# Patient Record
Sex: MALE | Race: WHITE | Employment: FULL TIME | URBAN - METROPOLITAN AREA
[De-identification: names, ages, dates, MRNs, and addresses within clinical notes are randomized per-mention and may not be internally consistent; named-entity substitution may affect disease eponyms.]

---

## 2019-04-05 LAB
ALBUMIN SERPL-MCNC: NORMAL G/DL
ALP BLD-CCNC: NORMAL U/L
ALT SERPL-CCNC: NORMAL U/L
ANION GAP SERPL CALCULATED.3IONS-SCNC: NORMAL MMOL/L
AST SERPL-CCNC: NORMAL U/L
BILIRUB SERPL-MCNC: NORMAL MG/DL (ref 0.1–1.4)
BUN BLDV-MCNC: NORMAL MG/DL
CALCIUM SERPL-MCNC: NORMAL MG/DL
CHLORIDE BLD-SCNC: NORMAL MMOL/L
CHOLESTEROL, TOTAL: 248 MG/DL
CHOLESTEROL/HDL RATIO: 4.5
CO2: NORMAL MMOL/L
CREAT SERPL-MCNC: NORMAL MG/DL
GFR CALCULATED: NORMAL
GLUCOSE BLD-MCNC: NORMAL MG/DL
HDLC SERPL-MCNC: 55 MG/DL (ref 35–70)
LDL CHOLESTEROL CALCULATED: 147 MG/DL (ref 0–160)
POTASSIUM SERPL-SCNC: NORMAL MMOL/L
SODIUM BLD-SCNC: NORMAL MMOL/L
TOTAL PROTEIN: NORMAL
TRIGL SERPL-MCNC: 280 MG/DL
VLDLC SERPL CALC-MCNC: NORMAL MG/DL

## 2019-09-27 ENCOUNTER — APPOINTMENT (OUTPATIENT)
Dept: GENERAL RADIOLOGY | Age: 54
End: 2019-09-27
Payer: COMMERCIAL

## 2019-09-27 ENCOUNTER — HOSPITAL ENCOUNTER (EMERGENCY)
Age: 54
Discharge: HOME | End: 2019-09-28
Attending: EMERGENCY MEDICINE
Payer: COMMERCIAL

## 2019-09-27 VITALS
BODY MASS INDEX: 41.75 KG/M2 | RESPIRATION RATE: 16 BRPM | HEART RATE: 91 BPM | TEMPERATURE: 98.6 F | HEIGHT: 73 IN | WEIGHT: 315 LBS | SYSTOLIC BLOOD PRESSURE: 132 MMHG | OXYGEN SATURATION: 92 % | DIASTOLIC BLOOD PRESSURE: 81 MMHG

## 2019-09-27 DIAGNOSIS — S93.05XA ANKLE DISLOCATION, LEFT, INITIAL ENCOUNTER: Primary | ICD-10-CM

## 2019-09-27 LAB
BASOPHILS ABSOLUTE: 0.05 E9/L (ref 0–0.2)
BASOPHILS RELATIVE PERCENT: 0.5 % (ref 0–2)
EKG ATRIAL RATE: 87 BPM
EKG P AXIS: 25 DEGREES
EKG P-R INTERVAL: 160 MS
EKG Q-T INTERVAL: 376 MS
EKG QRS DURATION: 86 MS
EKG QTC CALCULATION (BAZETT): 452 MS
EKG R AXIS: 44 DEGREES
EKG T AXIS: 12 DEGREES
EKG VENTRICULAR RATE: 87 BPM
EOSINOPHILS ABSOLUTE: 0.17 E9/L (ref 0.05–0.5)
EOSINOPHILS RELATIVE PERCENT: 1.5 % (ref 0–6)
HCT VFR BLD CALC: 48 % (ref 37–54)
HEMOGLOBIN: 16.7 G/DL (ref 12.5–16.5)
IMMATURE GRANULOCYTES #: 0.1 E9/L
IMMATURE GRANULOCYTES %: 0.9 % (ref 0–5)
LYMPHOCYTES ABSOLUTE: 1.72 E9/L (ref 1.5–4)
LYMPHOCYTES RELATIVE PERCENT: 15.6 % (ref 20–42)
MCH RBC QN AUTO: 32.1 PG (ref 26–35)
MCHC RBC AUTO-ENTMCNC: 34.8 % (ref 32–34.5)
MCV RBC AUTO: 92.1 FL (ref 80–99.9)
MONOCYTES ABSOLUTE: 0.87 E9/L (ref 0.1–0.95)
MONOCYTES RELATIVE PERCENT: 7.9 % (ref 2–12)
NEUTROPHILS ABSOLUTE: 8.14 E9/L (ref 1.8–7.3)
NEUTROPHILS RELATIVE PERCENT: 73.6 % (ref 43–80)
PDW BLD-RTO: 12.6 FL (ref 11.5–15)
PLATELET # BLD: 246 E9/L (ref 130–450)
PMV BLD AUTO: 9.5 FL (ref 7–12)
RBC # BLD: 5.21 E12/L (ref 3.8–5.8)
WBC # BLD: 11.1 E9/L (ref 4.5–11.5)

## 2019-09-27 PROCEDURE — 93010 ELECTROCARDIOGRAM REPORT: CPT | Performed by: INTERNAL MEDICINE

## 2019-09-27 PROCEDURE — 73610 X-RAY EXAM OF ANKLE: CPT

## 2019-09-27 PROCEDURE — 84484 ASSAY OF TROPONIN QUANT: CPT

## 2019-09-27 PROCEDURE — 80048 BASIC METABOLIC PNL TOTAL CA: CPT

## 2019-09-27 PROCEDURE — 29515 APPLICATION SHORT LEG SPLINT: CPT

## 2019-09-27 PROCEDURE — G0480 DRUG TEST DEF 1-7 CLASSES: HCPCS

## 2019-09-27 PROCEDURE — 85025 COMPLETE CBC W/AUTO DIFF WBC: CPT

## 2019-09-27 PROCEDURE — 83880 ASSAY OF NATRIURETIC PEPTIDE: CPT

## 2019-09-27 PROCEDURE — 81001 URINALYSIS AUTO W/SCOPE: CPT

## 2019-09-27 PROCEDURE — 93005 ELECTROCARDIOGRAM TRACING: CPT | Performed by: PHYSICIAN ASSISTANT

## 2019-09-27 PROCEDURE — 71045 X-RAY EXAM CHEST 1 VIEW: CPT

## 2019-09-27 PROCEDURE — 99284 EMERGENCY DEPT VISIT MOD MDM: CPT

## 2019-09-27 RX ORDER — LISINOPRIL 10 MG/1
20 TABLET ORAL DAILY
COMMUNITY
End: 2019-11-18

## 2019-09-27 RX ORDER — 0.9 % SODIUM CHLORIDE 0.9 %
1000 INTRAVENOUS SOLUTION INTRAVENOUS ONCE
Status: DISCONTINUED | OUTPATIENT
Start: 2019-09-27 | End: 2019-09-27

## 2019-09-27 ASSESSMENT — PAIN SCALES - GENERAL: PAINLEVEL_OUTOF10: 8

## 2019-09-27 ASSESSMENT — PAIN DESCRIPTION - PAIN TYPE: TYPE: ACUTE PAIN

## 2019-09-27 ASSESSMENT — PAIN DESCRIPTION - LOCATION: LOCATION: ANKLE

## 2019-09-27 ASSESSMENT — PAIN DESCRIPTION - ORIENTATION: ORIENTATION: RIGHT

## 2019-09-28 LAB
ANION GAP SERPL CALCULATED.3IONS-SCNC: 16 MMOL/L (ref 7–16)
BACTERIA: NORMAL /HPF
BILIRUBIN URINE: NEGATIVE
BLOOD, URINE: NEGATIVE
BUN BLDV-MCNC: 17 MG/DL (ref 6–20)
CALCIUM SERPL-MCNC: 9.4 MG/DL (ref 8.6–10.2)
CHLORIDE BLD-SCNC: 97 MMOL/L (ref 98–107)
CLARITY: CLEAR
CO2: 21 MMOL/L (ref 22–29)
COLOR: YELLOW
CREAT SERPL-MCNC: 1.1 MG/DL (ref 0.7–1.2)
ETHANOL: 109 MG/DL (ref 0–0.08)
GFR AFRICAN AMERICAN: >60
GFR NON-AFRICAN AMERICAN: >60 ML/MIN/1.73
GLUCOSE BLD-MCNC: 125 MG/DL (ref 74–99)
GLUCOSE URINE: NEGATIVE MG/DL
KETONES, URINE: NEGATIVE MG/DL
LEUKOCYTE ESTERASE, URINE: ABNORMAL
NITRITE, URINE: NEGATIVE
PH UA: 5.5 (ref 5–9)
POTASSIUM REFLEX MAGNESIUM: 5.7 MMOL/L (ref 3.5–5)
PRO-BNP: <5 PG/ML (ref 0–125)
PROTEIN UA: NEGATIVE MG/DL
RBC UA: NORMAL /HPF (ref 0–2)
SODIUM BLD-SCNC: 134 MMOL/L (ref 132–146)
SPECIFIC GRAVITY UA: 1.02 (ref 1–1.03)
TROPONIN: <0.01 NG/ML (ref 0–0.03)
UROBILINOGEN, URINE: 0.2 E.U./DL
WBC UA: NORMAL /HPF (ref 0–5)

## 2019-09-28 PROCEDURE — 2580000003 HC RX 258: Performed by: STUDENT IN AN ORGANIZED HEALTH CARE EDUCATION/TRAINING PROGRAM

## 2019-09-28 RX ORDER — 0.9 % SODIUM CHLORIDE 0.9 %
1000 INTRAVENOUS SOLUTION INTRAVENOUS ONCE
Status: COMPLETED | OUTPATIENT
Start: 2019-09-28 | End: 2019-09-28

## 2019-09-28 RX ORDER — OXYCODONE HYDROCHLORIDE AND ACETAMINOPHEN 5; 325 MG/1; MG/1
1 TABLET ORAL EVERY 8 HOURS PRN
Qty: 9 TABLET | Refills: 0 | Status: SHIPPED | OUTPATIENT
Start: 2019-09-28 | End: 2019-10-01

## 2019-09-28 RX ADMIN — SODIUM CHLORIDE 1000 ML: 9 INJECTION, SOLUTION INTRAVENOUS at 01:03

## 2019-09-28 ASSESSMENT — ENCOUNTER SYMPTOMS
ABDOMINAL DISTENTION: 0
DIARRHEA: 0
CHEST TIGHTNESS: 0
EYES NEGATIVE: 1
COUGH: 0
CONSTIPATION: 0
SHORTNESS OF BREATH: 0
ALLERGIC/IMMUNOLOGIC NEGATIVE: 1
RESPIRATORY NEGATIVE: 1
ABDOMINAL PAIN: 0

## 2019-10-01 PROBLEM — S82.434A CLOSED NONDISPLACED OBLIQUE FRACTURE OF SHAFT OF RIGHT FIBULA: Status: ACTIVE | Noted: 2019-10-01

## 2019-10-01 PROBLEM — S93.431A SPRAIN OF TIBIOFIBULAR LIGAMENT OF RIGHT ANKLE: Status: ACTIVE | Noted: 2019-10-01

## 2019-10-01 PROBLEM — S82.51XA: Status: ACTIVE | Noted: 2019-10-01

## 2019-10-02 ENCOUNTER — ANESTHESIA EVENT (OUTPATIENT)
Dept: OPERATING ROOM | Age: 54
End: 2019-10-02
Payer: COMMERCIAL

## 2019-10-02 ENCOUNTER — HOSPITAL ENCOUNTER (OUTPATIENT)
Age: 54
Setting detail: OUTPATIENT SURGERY
Discharge: HOME | End: 2019-10-02
Attending: ORTHOPAEDIC SURGERY | Admitting: ORTHOPAEDIC SURGERY
Payer: COMMERCIAL

## 2019-10-02 ENCOUNTER — ANESTHESIA (OUTPATIENT)
Dept: OPERATING ROOM | Age: 54
End: 2019-10-02
Payer: COMMERCIAL

## 2019-10-02 ENCOUNTER — HOSPITAL ENCOUNTER (OUTPATIENT)
Dept: GENERAL RADIOLOGY | Age: 54
Discharge: HOME | End: 2019-10-04
Attending: ORTHOPAEDIC SURGERY
Payer: COMMERCIAL

## 2019-10-02 ENCOUNTER — APPOINTMENT (OUTPATIENT)
Dept: GENERAL RADIOLOGY | Age: 54
End: 2019-10-02
Attending: ORTHOPAEDIC SURGERY
Payer: COMMERCIAL

## 2019-10-02 VITALS
OXYGEN SATURATION: 92 % | RESPIRATION RATE: 18 BRPM | HEIGHT: 73 IN | WEIGHT: 315 LBS | TEMPERATURE: 97.3 F | HEART RATE: 90 BPM | DIASTOLIC BLOOD PRESSURE: 84 MMHG | BODY MASS INDEX: 41.75 KG/M2 | SYSTOLIC BLOOD PRESSURE: 176 MMHG

## 2019-10-02 VITALS
TEMPERATURE: 97.3 F | DIASTOLIC BLOOD PRESSURE: 57 MMHG | OXYGEN SATURATION: 91 % | RESPIRATION RATE: 25 BRPM | SYSTOLIC BLOOD PRESSURE: 107 MMHG

## 2019-10-02 DIAGNOSIS — S82.434A CLOSED NONDISPLACED OBLIQUE FRACTURE OF SHAFT OF RIGHT FIBULA, INITIAL ENCOUNTER: ICD-10-CM

## 2019-10-02 DIAGNOSIS — R52 PAIN: ICD-10-CM

## 2019-10-02 DIAGNOSIS — S82.51XA CLOSED DISPLACED FRACTURE OF MEDIAL MALLEOLUS OF RIGHT TIBIA, INITIAL ENCOUNTER: Primary | ICD-10-CM

## 2019-10-02 DIAGNOSIS — S93.431A SPRAIN OF TIBIOFIBULAR LIGAMENT OF RIGHT ANKLE, INITIAL ENCOUNTER: ICD-10-CM

## 2019-10-02 PROCEDURE — 3700000000 HC ANESTHESIA ATTENDED CARE: Performed by: ORTHOPAEDIC SURGERY

## 2019-10-02 PROCEDURE — 6360000002 HC RX W HCPCS: Performed by: ANESTHESIOLOGY

## 2019-10-02 PROCEDURE — C1713 ANCHOR/SCREW BN/BN,TIS/BN: HCPCS | Performed by: ORTHOPAEDIC SURGERY

## 2019-10-02 PROCEDURE — C1769 GUIDE WIRE: HCPCS | Performed by: ORTHOPAEDIC SURGERY

## 2019-10-02 PROCEDURE — 2580000003 HC RX 258: Performed by: ORTHOPAEDIC SURGERY

## 2019-10-02 PROCEDURE — 2580000003 HC RX 258: Performed by: NURSE ANESTHETIST, CERTIFIED REGISTERED

## 2019-10-02 PROCEDURE — 64446 NJX AA&/STRD SC NRV NFS IMG: CPT | Performed by: ANESTHESIOLOGY

## 2019-10-02 PROCEDURE — 2500000003 HC RX 250 WO HCPCS: Performed by: NURSE ANESTHETIST, CERTIFIED REGISTERED

## 2019-10-02 PROCEDURE — 2500000003 HC RX 250 WO HCPCS: Performed by: ANESTHESIOLOGY

## 2019-10-02 PROCEDURE — 3600000004 HC SURGERY LEVEL 4 BASE: Performed by: ORTHOPAEDIC SURGERY

## 2019-10-02 PROCEDURE — 3700000001 HC ADD 15 MINUTES (ANESTHESIA): Performed by: ORTHOPAEDIC SURGERY

## 2019-10-02 PROCEDURE — 73610 X-RAY EXAM OF ANKLE: CPT

## 2019-10-02 PROCEDURE — 7100000001 HC PACU RECOVERY - ADDTL 15 MIN: Performed by: ORTHOPAEDIC SURGERY

## 2019-10-02 PROCEDURE — 7100000010 HC PHASE II RECOVERY - FIRST 15 MIN: Performed by: ORTHOPAEDIC SURGERY

## 2019-10-02 PROCEDURE — 6360000002 HC RX W HCPCS: Performed by: NURSE ANESTHETIST, CERTIFIED REGISTERED

## 2019-10-02 PROCEDURE — 7100000000 HC PACU RECOVERY - FIRST 15 MIN: Performed by: ORTHOPAEDIC SURGERY

## 2019-10-02 PROCEDURE — 2720000010 HC SURG SUPPLY STERILE: Performed by: ORTHOPAEDIC SURGERY

## 2019-10-02 PROCEDURE — 2709999900 HC NON-CHARGEABLE SUPPLY: Performed by: ORTHOPAEDIC SURGERY

## 2019-10-02 PROCEDURE — 3600000014 HC SURGERY LEVEL 4 ADDTL 15MIN: Performed by: ORTHOPAEDIC SURGERY

## 2019-10-02 PROCEDURE — 2500000003 HC RX 250 WO HCPCS: Performed by: ORTHOPAEDIC SURGERY

## 2019-10-02 PROCEDURE — 3209999900 FLUORO FOR SURGICAL PROCEDURES

## 2019-10-02 PROCEDURE — 7100000011 HC PHASE II RECOVERY - ADDTL 15 MIN: Performed by: ORTHOPAEDIC SURGERY

## 2019-10-02 PROCEDURE — 6360000002 HC RX W HCPCS: Performed by: ORTHOPAEDIC SURGERY

## 2019-10-02 DEVICE — SCREW BNE L38MM DIA4MM S STL CANN SHT 1/3 THRD SM HEX SOCK: Type: IMPLANTABLE DEVICE | Site: ANKLE | Status: FUNCTIONAL

## 2019-10-02 DEVICE — SCREW BNE L58MM DIA4MM CORT S STL ST NONCANNULATED: Type: IMPLANTABLE DEVICE | Site: ANKLE | Status: FUNCTIONAL

## 2019-10-02 DEVICE — SCREW BNE L65MM DIA4MM CORT S STL ST NONCANNULATED: Type: IMPLANTABLE DEVICE | Site: ANKLE | Status: FUNCTIONAL

## 2019-10-02 RX ORDER — ROPIVACAINE HYDROCHLORIDE 5 MG/ML
30 INJECTION, SOLUTION EPIDURAL; INFILTRATION; PERINEURAL ONCE
Status: COMPLETED | OUTPATIENT
Start: 2019-10-02 | End: 2019-10-02

## 2019-10-02 RX ORDER — NEOSTIGMINE METHYLSULFATE 1 MG/ML
INJECTION, SOLUTION INTRAVENOUS PRN
Status: DISCONTINUED | OUTPATIENT
Start: 2019-10-02 | End: 2019-10-02 | Stop reason: SDUPTHER

## 2019-10-02 RX ORDER — FENTANYL CITRATE 50 UG/ML
25 INJECTION, SOLUTION INTRAMUSCULAR; INTRAVENOUS PRN
Status: DISCONTINUED | OUTPATIENT
Start: 2019-10-02 | End: 2019-10-02 | Stop reason: HOSPADM

## 2019-10-02 RX ORDER — DEXAMETHASONE SODIUM PHOSPHATE 4 MG/ML
INJECTION, SOLUTION INTRA-ARTICULAR; INTRALESIONAL; INTRAMUSCULAR; INTRAVENOUS; SOFT TISSUE PRN
Status: DISCONTINUED | OUTPATIENT
Start: 2019-10-02 | End: 2019-10-02 | Stop reason: SDUPTHER

## 2019-10-02 RX ORDER — OXYCODONE HYDROCHLORIDE AND ACETAMINOPHEN 5; 325 MG/1; MG/1
.5-1 TABLET ORAL EVERY 6 HOURS PRN
Qty: 28 TABLET | Refills: 0 | Status: SHIPPED | OUTPATIENT
Start: 2019-10-02 | End: 2019-10-09

## 2019-10-02 RX ORDER — ROCURONIUM BROMIDE 10 MG/ML
INJECTION, SOLUTION INTRAVENOUS PRN
Status: DISCONTINUED | OUTPATIENT
Start: 2019-10-02 | End: 2019-10-02 | Stop reason: SDUPTHER

## 2019-10-02 RX ORDER — FENTANYL CITRATE 50 UG/ML
25 INJECTION, SOLUTION INTRAMUSCULAR; INTRAVENOUS EVERY 5 MIN PRN
Status: DISCONTINUED | OUTPATIENT
Start: 2019-10-02 | End: 2019-10-02 | Stop reason: HOSPADM

## 2019-10-02 RX ORDER — SODIUM CHLORIDE 9 MG/ML
INJECTION, SOLUTION INTRAVENOUS CONTINUOUS PRN
Status: DISCONTINUED | OUTPATIENT
Start: 2019-10-02 | End: 2019-10-02 | Stop reason: SDUPTHER

## 2019-10-02 RX ORDER — GLYCOPYRROLATE 1 MG/5 ML
SYRINGE (ML) INTRAVENOUS PRN
Status: DISCONTINUED | OUTPATIENT
Start: 2019-10-02 | End: 2019-10-02 | Stop reason: SDUPTHER

## 2019-10-02 RX ORDER — ONDANSETRON 2 MG/ML
INJECTION INTRAMUSCULAR; INTRAVENOUS PRN
Status: DISCONTINUED | OUTPATIENT
Start: 2019-10-02 | End: 2019-10-02 | Stop reason: SDUPTHER

## 2019-10-02 RX ORDER — LIDOCAINE HYDROCHLORIDE 20 MG/ML
INJECTION, SOLUTION EPIDURAL; INFILTRATION; INTRACAUDAL; PERINEURAL PRN
Status: DISCONTINUED | OUTPATIENT
Start: 2019-10-02 | End: 2019-10-02 | Stop reason: SDUPTHER

## 2019-10-02 RX ORDER — SODIUM CHLORIDE, SODIUM LACTATE, POTASSIUM CHLORIDE, CALCIUM CHLORIDE 600; 310; 30; 20 MG/100ML; MG/100ML; MG/100ML; MG/100ML
INJECTION, SOLUTION INTRAVENOUS CONTINUOUS PRN
Status: DISCONTINUED | OUTPATIENT
Start: 2019-10-02 | End: 2019-10-02 | Stop reason: SDUPTHER

## 2019-10-02 RX ORDER — PROPOFOL 10 MG/ML
INJECTION, EMULSION INTRAVENOUS PRN
Status: DISCONTINUED | OUTPATIENT
Start: 2019-10-02 | End: 2019-10-02 | Stop reason: SDUPTHER

## 2019-10-02 RX ORDER — FENTANYL CITRATE 50 UG/ML
INJECTION, SOLUTION INTRAMUSCULAR; INTRAVENOUS PRN
Status: DISCONTINUED | OUTPATIENT
Start: 2019-10-02 | End: 2019-10-02 | Stop reason: SDUPTHER

## 2019-10-02 RX ORDER — MIDAZOLAM HYDROCHLORIDE 1 MG/ML
1 INJECTION INTRAMUSCULAR; INTRAVENOUS EVERY 5 MIN PRN
Status: DISCONTINUED | OUTPATIENT
Start: 2019-10-02 | End: 2019-10-02 | Stop reason: HOSPADM

## 2019-10-02 RX ORDER — ASPIRIN 81 MG/1
81 TABLET ORAL DAILY
Qty: 100 TABLET | Refills: 0 | COMMUNITY
Start: 2019-10-03 | End: 2019-11-18

## 2019-10-02 RX ORDER — BUPIVACAINE HYDROCHLORIDE AND EPINEPHRINE 5; 5 MG/ML; UG/ML
INJECTION, SOLUTION EPIDURAL; INTRACAUDAL; PERINEURAL PRN
Status: DISCONTINUED | OUTPATIENT
Start: 2019-10-02 | End: 2019-10-02 | Stop reason: ALTCHOICE

## 2019-10-02 RX ORDER — LIDOCAINE HYDROCHLORIDE 10 MG/ML
5 INJECTION, SOLUTION EPIDURAL; INFILTRATION; INTRACAUDAL; PERINEURAL ONCE
Status: COMPLETED | OUTPATIENT
Start: 2019-10-02 | End: 2019-10-02

## 2019-10-02 RX ORDER — OXYCODONE HYDROCHLORIDE AND ACETAMINOPHEN 5; 325 MG/1; MG/1
1 TABLET ORAL
Status: DISCONTINUED | OUTPATIENT
Start: 2019-10-02 | End: 2019-10-02 | Stop reason: HOSPADM

## 2019-10-02 RX ADMIN — Medication 2 MG: at 17:57

## 2019-10-02 RX ADMIN — MIDAZOLAM HYDROCHLORIDE 2 MG: 1 INJECTION, SOLUTION INTRAMUSCULAR; INTRAVENOUS at 15:56

## 2019-10-02 RX ADMIN — DEXAMETHASONE SODIUM PHOSPHATE 10 MG: 4 INJECTION, SOLUTION INTRAMUSCULAR; INTRAVENOUS at 16:44

## 2019-10-02 RX ADMIN — PROPOFOL 200 MG: 10 INJECTION, EMULSION INTRAVENOUS at 16:36

## 2019-10-02 RX ADMIN — LIDOCAINE HYDROCHLORIDE 100 MG: 20 INJECTION, SOLUTION EPIDURAL; INFILTRATION; INTRACAUDAL; PERINEURAL at 16:32

## 2019-10-02 RX ADMIN — CEFAZOLIN SODIUM 3 G: 10 INJECTION, POWDER, FOR SOLUTION INTRAVENOUS at 16:25

## 2019-10-02 RX ADMIN — Medication 0.4 MG: at 17:57

## 2019-10-02 RX ADMIN — ONDANSETRON HYDROCHLORIDE 4 MG: 2 INJECTION, SOLUTION INTRAMUSCULAR; INTRAVENOUS at 16:44

## 2019-10-02 RX ADMIN — LIDOCAINE HYDROCHLORIDE 5 ML: 10 INJECTION, SOLUTION EPIDURAL; INFILTRATION; INTRACAUDAL; PERINEURAL at 16:08

## 2019-10-02 RX ADMIN — FENTANYL CITRATE 100 MCG: 50 INJECTION, SOLUTION INTRAMUSCULAR; INTRAVENOUS at 16:32

## 2019-10-02 RX ADMIN — ROPIVACAINE HYDROCHLORIDE 30 ML: 5 INJECTION, SOLUTION EPIDURAL; INFILTRATION; PERINEURAL at 16:09

## 2019-10-02 RX ADMIN — SODIUM CHLORIDE: 9 INJECTION, SOLUTION INTRAVENOUS at 16:25

## 2019-10-02 RX ADMIN — HYDROMORPHONE HYDROCHLORIDE 0.5 MG: 1 INJECTION, SOLUTION INTRAMUSCULAR; INTRAVENOUS; SUBCUTANEOUS at 18:57

## 2019-10-02 RX ADMIN — SODIUM CHLORIDE, POTASSIUM CHLORIDE, SODIUM LACTATE AND CALCIUM CHLORIDE: 600; 310; 30; 20 INJECTION, SOLUTION INTRAVENOUS at 16:53

## 2019-10-02 RX ADMIN — FENTANYL CITRATE 100 MCG: 50 INJECTION INTRAMUSCULAR; INTRAVENOUS at 15:56

## 2019-10-02 RX ADMIN — ROCURONIUM BROMIDE 50 MG: 10 SOLUTION INTRAVENOUS at 16:36

## 2019-10-02 ASSESSMENT — PULMONARY FUNCTION TESTS
PIF_VALUE: 29
PIF_VALUE: 34
PIF_VALUE: 29
PIF_VALUE: 35
PIF_VALUE: 28
PIF_VALUE: 28
PIF_VALUE: 27
PIF_VALUE: 28
PIF_VALUE: 29
PIF_VALUE: 28
PIF_VALUE: 26
PIF_VALUE: 28
PIF_VALUE: 29
PIF_VALUE: 28
PIF_VALUE: 25
PIF_VALUE: 24
PIF_VALUE: 28
PIF_VALUE: 29
PIF_VALUE: 28
PIF_VALUE: 28
PIF_VALUE: 27
PIF_VALUE: 29
PIF_VALUE: 28
PIF_VALUE: 29
PIF_VALUE: 28
PIF_VALUE: 28
PIF_VALUE: 29
PIF_VALUE: 28
PIF_VALUE: 28
PIF_VALUE: 26
PIF_VALUE: 28
PIF_VALUE: 28
PIF_VALUE: 29
PIF_VALUE: 27
PIF_VALUE: 0
PIF_VALUE: 28
PIF_VALUE: 28
PIF_VALUE: 29
PIF_VALUE: 26
PIF_VALUE: 28
PIF_VALUE: 1
PIF_VALUE: 2
PIF_VALUE: 28
PIF_VALUE: 28
PIF_VALUE: 29
PIF_VALUE: 28
PIF_VALUE: 29
PIF_VALUE: 26
PIF_VALUE: 23
PIF_VALUE: 28
PIF_VALUE: 28
PIF_VALUE: 29
PIF_VALUE: 28
PIF_VALUE: 29
PIF_VALUE: 28
PIF_VALUE: 29
PIF_VALUE: 23
PIF_VALUE: 28
PIF_VALUE: 21
PIF_VALUE: 28
PIF_VALUE: 1
PIF_VALUE: 28
PIF_VALUE: 20
PIF_VALUE: 27
PIF_VALUE: 28
PIF_VALUE: 28
PIF_VALUE: 3
PIF_VALUE: 29
PIF_VALUE: 28
PIF_VALUE: 0
PIF_VALUE: 25
PIF_VALUE: 28
PIF_VALUE: 26
PIF_VALUE: 28
PIF_VALUE: 28
PIF_VALUE: 32
PIF_VALUE: 28
PIF_VALUE: 28
PIF_VALUE: 29
PIF_VALUE: 0
PIF_VALUE: 37
PIF_VALUE: 29
PIF_VALUE: 28
PIF_VALUE: 28
PIF_VALUE: 27
PIF_VALUE: 29
PIF_VALUE: 28
PIF_VALUE: 28
PIF_VALUE: 31
PIF_VALUE: 29
PIF_VALUE: 28
PIF_VALUE: 28
PIF_VALUE: 26
PIF_VALUE: 1
PIF_VALUE: 28
PIF_VALUE: 40
PIF_VALUE: 26

## 2019-10-02 ASSESSMENT — PAIN SCALES - GENERAL
PAINLEVEL_OUTOF10: 7
PAINLEVEL_OUTOF10: 0

## 2019-10-02 ASSESSMENT — PAIN DESCRIPTION - LOCATION: LOCATION: LEG

## 2019-10-02 ASSESSMENT — PAIN DESCRIPTION - PAIN TYPE: TYPE: SURGICAL PAIN

## 2019-10-02 ASSESSMENT — PAIN - FUNCTIONAL ASSESSMENT: PAIN_FUNCTIONAL_ASSESSMENT: 0-10

## 2019-10-02 ASSESSMENT — PAIN DESCRIPTION - ORIENTATION: ORIENTATION: RIGHT;LOWER

## 2019-10-03 PROCEDURE — 76942 ECHO GUIDE FOR BIOPSY: CPT | Performed by: ANESTHESIOLOGY

## 2019-10-03 PROCEDURE — 6360000002 HC RX W HCPCS: Performed by: ANESTHESIOLOGY

## 2019-10-03 RX ORDER — ROPIVACAINE HYDROCHLORIDE 5 MG/ML
INJECTION, SOLUTION EPIDURAL; INFILTRATION; PERINEURAL
Status: DISCONTINUED | OUTPATIENT
Start: 2019-10-03 | End: 2019-10-03 | Stop reason: SDUPTHER

## 2019-10-03 RX ADMIN — ROPIVACAINE HYDROCHLORIDE 30 ML: 5 INJECTION, SOLUTION EPIDURAL; INFILTRATION; PERINEURAL at 15:36

## 2019-10-07 RX ORDER — FENTANYL CITRATE 50 UG/ML
25 INJECTION, SOLUTION INTRAMUSCULAR; INTRAVENOUS
COMMUNITY
Start: 2019-10-02 | End: 2019-11-18

## 2019-10-07 RX ORDER — MIDAZOLAM HYDROCHLORIDE 1 MG/ML
1 INJECTION INTRAMUSCULAR; INTRAVENOUS
COMMUNITY
Start: 2019-10-02 | End: 2019-11-18

## 2019-11-14 PROBLEM — I10 ESSENTIAL HYPERTENSION: Status: ACTIVE | Noted: 2019-04-19

## 2019-11-18 ENCOUNTER — OFFICE VISIT (OUTPATIENT)
Dept: FAMILY MEDICINE CLINIC | Age: 54
End: 2019-11-18
Payer: COMMERCIAL

## 2019-11-18 VITALS
DIASTOLIC BLOOD PRESSURE: 80 MMHG | SYSTOLIC BLOOD PRESSURE: 132 MMHG | BODY MASS INDEX: 41.75 KG/M2 | HEIGHT: 73 IN | WEIGHT: 315 LBS | OXYGEN SATURATION: 96 % | TEMPERATURE: 98.2 F | HEART RATE: 90 BPM

## 2019-11-18 DIAGNOSIS — E66.01 CLASS 3 SEVERE OBESITY DUE TO EXCESS CALORIES WITHOUT SERIOUS COMORBIDITY WITH BODY MASS INDEX (BMI) OF 45.0 TO 49.9 IN ADULT (HCC): ICD-10-CM

## 2019-11-18 DIAGNOSIS — R73.01 IMPAIRED FASTING BLOOD SUGAR: ICD-10-CM

## 2019-11-18 DIAGNOSIS — E78.2 MIXED HYPERLIPIDEMIA: ICD-10-CM

## 2019-11-18 DIAGNOSIS — Z12.5 ENCOUNTER FOR SCREENING FOR MALIGNANT NEOPLASM OF PROSTATE: ICD-10-CM

## 2019-11-18 DIAGNOSIS — I10 ESSENTIAL HYPERTENSION: Primary | ICD-10-CM

## 2019-11-18 PROBLEM — E66.9 OBESITY: Status: ACTIVE | Noted: 2019-11-18

## 2019-11-18 PROBLEM — E78.5 HYPERLIPIDEMIA: Status: ACTIVE | Noted: 2019-11-18

## 2019-11-18 PROCEDURE — 99214 OFFICE O/P EST MOD 30 MIN: CPT | Performed by: FAMILY MEDICINE

## 2019-11-18 RX ORDER — LISINOPRIL 20 MG/1
20 TABLET ORAL DAILY
Qty: 90 TABLET | Refills: 1 | Status: SHIPPED
Start: 2019-11-18 | End: 2020-06-08 | Stop reason: SDUPTHER

## 2019-11-18 RX ORDER — LISINOPRIL 20 MG/1
1 TABLET ORAL DAILY
Refills: 0 | COMMUNITY
Start: 2019-10-16 | End: 2019-11-18 | Stop reason: SDUPTHER

## 2019-11-18 ASSESSMENT — ENCOUNTER SYMPTOMS
NAUSEA: 0
VOMITING: 0
DIARRHEA: 0
SORE THROAT: 0
EYE PAIN: 0
ABDOMINAL PAIN: 0
CONSTIPATION: 0
CHEST TIGHTNESS: 0
BACK PAIN: 0
SINUS PAIN: 0
COUGH: 0
SHORTNESS OF BREATH: 0
WHEEZING: 0

## 2019-11-18 ASSESSMENT — PATIENT HEALTH QUESTIONNAIRE - PHQ9
2. FEELING DOWN, DEPRESSED OR HOPELESS: 0
SUM OF ALL RESPONSES TO PHQ QUESTIONS 1-9: 0
1. LITTLE INTEREST OR PLEASURE IN DOING THINGS: 0
SUM OF ALL RESPONSES TO PHQ QUESTIONS 1-9: 0
SUM OF ALL RESPONSES TO PHQ9 QUESTIONS 1 & 2: 0

## 2020-06-08 RX ORDER — LISINOPRIL 20 MG/1
20 TABLET ORAL DAILY
Qty: 30 TABLET | Refills: 0 | Status: SHIPPED
Start: 2020-06-08 | End: 2020-06-17 | Stop reason: SDUPTHER

## 2020-06-17 ENCOUNTER — OFFICE VISIT (OUTPATIENT)
Dept: FAMILY MEDICINE CLINIC | Age: 55
End: 2020-06-17
Payer: COMMERCIAL

## 2020-06-17 VITALS
SYSTOLIC BLOOD PRESSURE: 130 MMHG | BODY MASS INDEX: 41.75 KG/M2 | HEART RATE: 78 BPM | HEIGHT: 73 IN | OXYGEN SATURATION: 95 % | DIASTOLIC BLOOD PRESSURE: 84 MMHG | TEMPERATURE: 97.9 F | WEIGHT: 315 LBS

## 2020-06-17 PROCEDURE — 99396 PREV VISIT EST AGE 40-64: CPT | Performed by: FAMILY MEDICINE

## 2020-06-17 RX ORDER — LISINOPRIL 20 MG/1
20 TABLET ORAL DAILY
Qty: 90 TABLET | Refills: 1 | Status: SHIPPED
Start: 2020-06-17 | End: 2020-12-29 | Stop reason: SDUPTHER

## 2020-06-17 ASSESSMENT — ENCOUNTER SYMPTOMS
SORE THROAT: 0
WHEEZING: 0
CONSTIPATION: 0
SINUS PAIN: 0
ABDOMINAL PAIN: 0
SHORTNESS OF BREATH: 0
COUGH: 0
NAUSEA: 0
EYE PAIN: 0
DIARRHEA: 0
VOMITING: 0
BACK PAIN: 0

## 2020-06-17 NOTE — PROGRESS NOTES
right tibia 10/01/2019    Sprain of tibiofibular ligament of right ankle 10/01/2019    Closed nondisplaced oblique fracture of shaft of right fibula 10/01/2019    Essential hypertension 04/19/2019      Past Surgical History:   Procedure Laterality Date    ANKLE FRACTURE SURGERY Right 10/2/2019    RIGHT MEDIAL MALLEOLUS OPEN REDUCTION INTERNAL FIXATION, OPEN REDUCTION INTERNAL FIXATION RIGHT SYNDESMOSIS performed by Doreen Andres MD at [address] [place] [address]      with adenoidectomy      Social History     Tobacco History     Smoking Status  Never Smoker    Smokeless Tobacco Use  Never Used          Alcohol History     Alcohol Use Status  Yes Comment  socially          Drug Use     Drug Use Status  Never          Sexual Activity     Sexually Active  Not Asked                  /84   Pulse 78   Temp 97.9 °F (36.6 °C)   Ht 6' 1\" (1.854 m)   Wt (!) 367 lb 12.8 oz (166.8 kg)   SpO2 95%   BMI 48.53 kg/m²     EXAM:   Physical Exam  Vitals signs and nursing note reviewed. Constitutional:       Appearance: Normal appearance. He is well-developed. He is obese. HENT:      Head: Normocephalic and atraumatic. Right Ear: Tympanic membrane normal.      Left Ear: Tympanic membrane normal.      Nose: Nose normal.      Mouth/Throat:      Mouth: Mucous membranes are moist.   Eyes:      Pupils: Pupils are equal, round, and reactive to light. Neck:      Musculoskeletal: Normal range of motion. Cardiovascular:      Rate and Rhythm: Normal rate and regular rhythm. Pulmonary:      Effort: Pulmonary effort is normal.      Breath sounds: Normal breath sounds. Abdominal:      General: Bowel sounds are normal.      Palpations: Abdomen is soft. Musculoskeletal:      Comments: Gait normal in the offce today   Skin:     General: Skin is warm and dry. Neurological:      General: No focal deficit present. Mental Status: He is alert and oriented to person, place, and time.

## 2020-11-20 ENCOUNTER — OFFICE VISIT (OUTPATIENT)
Dept: PRIMARY CARE CLINIC | Age: 55
End: 2020-11-20
Payer: COMMERCIAL

## 2020-11-20 VITALS
BODY MASS INDEX: 41.75 KG/M2 | DIASTOLIC BLOOD PRESSURE: 91 MMHG | HEART RATE: 102 BPM | HEIGHT: 73 IN | SYSTOLIC BLOOD PRESSURE: 147 MMHG | OXYGEN SATURATION: 95 % | TEMPERATURE: 98.9 F | WEIGHT: 315 LBS | RESPIRATION RATE: 20 BRPM

## 2020-11-20 LAB
Lab: ABNORMAL
QC PASS/FAIL: ABNORMAL
SARS-COV-2, POC: DETECTED

## 2020-11-20 PROCEDURE — 87426 SARSCOV CORONAVIRUS AG IA: CPT | Performed by: NURSE PRACTITIONER

## 2020-11-20 PROCEDURE — 99213 OFFICE O/P EST LOW 20 MIN: CPT | Performed by: NURSE PRACTITIONER

## 2020-11-20 RX ORDER — ASCORBIC ACID 500 MG
1000 TABLET ORAL DAILY
Qty: 20 TABLET | Refills: 0 | Status: SHIPPED
Start: 2020-11-20 | End: 2020-12-29

## 2020-11-20 NOTE — PROGRESS NOTES
Chief Complaint   Cough ( x 2 days); Headache; and Covid Testing      History of Present Illness   Source of history provided by:  patient. Charlene Ledezma is a 53 y.o. old male who presents to the flu clinic with complaints of Headache, Rhinorrhea, dry Cough and Patient was exposed to someone who is a known Covid-19 infected person or directly caring for such person x 2 days. States symptoms have improved since onset. Has been taking Tylenol without symptomatic relief. Denies any Fever, Shortness of breath, Nausea, Vomiting, Chest Pain, Abdominal Pain, Rash or Lethargy. Denies any hx of asthma, COPD or emphysema. ROS   Pertinent positives and negatives are stated within HPI, all other systems reviewed and are negative. Past Medical History:  has a past medical history of Fracture, Hyperlipidemia, Hypertension, Obesity, and Pre-diabetes. Past Surgical History:  has a past surgical history that includes Nasal fracture surgery (1970); Ankle fracture surgery (Right, 10/2/2019); and Tonsillectomy. Social History:  reports that he has never smoked. He has never used smokeless tobacco. He reports current alcohol use. He reports that he does not use drugs. Family History: family history is not on file. Allergies: Patient has no known allergies. Physical Exam   Vital Signs:  BP (!) 147/91   Pulse 102   Temp 98.9 °F (37.2 °C) (Oral)   Resp 20   Ht 6' 1\" (1.854 m)   Wt (!) 367 lb (166.5 kg)   SpO2 95%   BMI 48.42 kg/m²    Oxygen Saturation Interpretation: Normal.    Constitutional:  Alert, development consistent with age. NAD. Head:  NC/NT. Airway patent. Ears: TMs clear bilaterally. Canals without exudate or swelling bilaterally. Mouth: Posterior pharynx with mild erythema and clear postnasal drip. There is no tonsillar hypertrophy or exudate. Neck:  Normal ROM. Supple. No anterior cervical adenopathy noted. Lungs: CTAB without wheezes, rales, or rhonchi.    CV:  Regular rate and rhythm, normal heart sounds, without pathological murmurs, ectopy, gallops, or rubs. Skin:  Normal turgor. Warm, dry, without visible rash. Lymphatic: No lymphangitis or adenopathy noted. Neurological:  Oriented. Motor functions intact. Lab / Imaging Results   (All laboratory and radiology results have been personally reviewed by myself)  Labs:  Results for orders placed or performed in visit on 11/20/20   POCT COVID-19, Antigen   Result Value Ref Range    SARS-COV-2, POC Detected (A) Not Detected    Lot Number [id number]     QC Pass/Fail pass        Imaging: All Radiology results interpreted by Radiologist unless otherwise noted. No results found. Medical Decision Making   Pt non-toxic, in no apparent distress and stable at time of discharge. Assessment/Plan   Sandra Mcginnis was seen today for cough, headache and covid testing. Diagnoses and all orders for this visit:    COVID-19  -     POCT COVID-19, Antigen  -     ascorbic acid (VITAMIN C) 500 MG tablet; Take 2 tablets by mouth daily for 10 days  -     zinc sulfate (ZINCATE) 50 MG CAPS capsule; Take 1 capsule by mouth daily for 10 days    Rapid COVID-19 test positive in office, patient advised of test results. Patient does require full 10-day strict isolation from symptom onset. Increase fluids and rest. Symptomatic relief discussed including Tylenol prn pain/fever. Schedule f/u with PCP in 7-10 days if symptoms persist. ED sooner if symptoms worsen or change. ED immediately with high or refractory fever, progressive SOB, dyspnea, CP, calf pain/swelling, shaking chills, vomiting, abdominal pain, lethargy, flank pain, or decreased urinary output. Pt verbalizes understanding and is in agreement with plan of care. All questions answered. Jv Clause, APRN - CNP    This visit was provided as a focused evaluation during the COVID -19 pandemic/national emergency. A comprehensive review of all previous patient history and testing was not conducted.

## 2020-11-27 ENCOUNTER — TELEPHONE (OUTPATIENT)
Dept: FAMILY MEDICINE CLINIC | Age: 55
End: 2020-11-27

## 2020-11-27 NOTE — TELEPHONE ENCOUNTER
Shirley Ada called in asking for a return to work slip, he was seen in the flu clinic on 11/27/2020 . His work is requiring him to have a return to work slip. He is returning to work 11.30.2020 Can you do this from him ?? If so we can email the letter to [email or web address]

## 2020-11-30 NOTE — TELEPHONE ENCOUNTER
Tamika Prescott is off this week. I put an updated work excuse in patient's chart. He is cleared to return to work immediately.

## 2020-12-24 DIAGNOSIS — Z12.5 ENCOUNTER FOR SCREENING FOR MALIGNANT NEOPLASM OF PROSTATE: ICD-10-CM

## 2020-12-24 DIAGNOSIS — R73.01 IMPAIRED FASTING BLOOD SUGAR: ICD-10-CM

## 2020-12-24 DIAGNOSIS — I10 ESSENTIAL HYPERTENSION: ICD-10-CM

## 2020-12-24 DIAGNOSIS — E78.2 MIXED HYPERLIPIDEMIA: ICD-10-CM

## 2020-12-24 LAB
ALBUMIN SERPL-MCNC: 4.2 G/DL (ref 3.5–5.2)
ALP BLD-CCNC: 61 U/L (ref 40–129)
ALT SERPL-CCNC: 48 U/L (ref 0–40)
ANION GAP SERPL CALCULATED.3IONS-SCNC: 13 MMOL/L (ref 7–16)
AST SERPL-CCNC: 29 U/L (ref 0–39)
BASOPHILS ABSOLUTE: 0.04 E9/L (ref 0–0.2)
BASOPHILS RELATIVE PERCENT: 0.7 % (ref 0–2)
BILIRUB SERPL-MCNC: 0.8 MG/DL (ref 0–1.2)
BUN BLDV-MCNC: 22 MG/DL (ref 6–20)
CALCIUM SERPL-MCNC: 9.5 MG/DL (ref 8.6–10.2)
CHLORIDE BLD-SCNC: 101 MMOL/L (ref 98–107)
CHOLESTEROL, TOTAL: 240 MG/DL (ref 0–199)
CO2: 22 MMOL/L (ref 22–29)
CREAT SERPL-MCNC: 1 MG/DL (ref 0.7–1.2)
EOSINOPHILS ABSOLUTE: 0.14 E9/L (ref 0.05–0.5)
EOSINOPHILS RELATIVE PERCENT: 2.4 % (ref 0–6)
GFR AFRICAN AMERICAN: >60
GFR NON-AFRICAN AMERICAN: >60 ML/MIN/1.73
GLUCOSE BLD-MCNC: 111 MG/DL (ref 74–99)
HBA1C MFR BLD: 5.8 % (ref 4–5.6)
HCT VFR BLD CALC: 47.8 % (ref 37–54)
HDLC SERPL-MCNC: 45 MG/DL
HEMOGLOBIN: 16 G/DL (ref 12.5–16.5)
IMMATURE GRANULOCYTES #: 0.05 E9/L
IMMATURE GRANULOCYTES %: 0.8 % (ref 0–5)
LDL CHOLESTEROL CALCULATED: ABNORMAL MG/DL (ref 0–99)
LYMPHOCYTES ABSOLUTE: 1.56 E9/L (ref 1.5–4)
LYMPHOCYTES RELATIVE PERCENT: 26.3 % (ref 20–42)
MCH RBC QN AUTO: 31.5 PG (ref 26–35)
MCHC RBC AUTO-ENTMCNC: 33.5 % (ref 32–34.5)
MCV RBC AUTO: 94.1 FL (ref 80–99.9)
MONOCYTES ABSOLUTE: 0.66 E9/L (ref 0.1–0.95)
MONOCYTES RELATIVE PERCENT: 11.1 % (ref 2–12)
NEUTROPHILS ABSOLUTE: 3.49 E9/L (ref 1.8–7.3)
NEUTROPHILS RELATIVE PERCENT: 58.7 % (ref 43–80)
PDW BLD-RTO: 12.8 FL (ref 11.5–15)
PLATELET # BLD: 228 E9/L (ref 130–450)
PMV BLD AUTO: 10.2 FL (ref 7–12)
POTASSIUM SERPL-SCNC: 4.4 MMOL/L (ref 3.5–5)
PROSTATE SPECIFIC ANTIGEN: 0.96 NG/ML (ref 0–4)
RBC # BLD: 5.08 E12/L (ref 3.8–5.8)
SODIUM BLD-SCNC: 136 MMOL/L (ref 132–146)
TOTAL PROTEIN: 6.9 G/DL (ref 6.4–8.3)
TRIGL SERPL-MCNC: 442 MG/DL (ref 0–149)
VLDLC SERPL CALC-MCNC: ABNORMAL MG/DL
WBC # BLD: 5.9 E9/L (ref 4.5–11.5)

## 2020-12-29 ENCOUNTER — OFFICE VISIT (OUTPATIENT)
Dept: FAMILY MEDICINE CLINIC | Age: 55
End: 2020-12-29
Payer: COMMERCIAL

## 2020-12-29 VITALS
DIASTOLIC BLOOD PRESSURE: 80 MMHG | HEIGHT: 73 IN | HEART RATE: 88 BPM | SYSTOLIC BLOOD PRESSURE: 120 MMHG | TEMPERATURE: 98.2 F | OXYGEN SATURATION: 98 % | BODY MASS INDEX: 41.75 KG/M2 | WEIGHT: 315 LBS

## 2020-12-29 PROBLEM — E66.9 OBESITY: Status: RESOLVED | Noted: 2019-11-18 | Resolved: 2020-12-29

## 2020-12-29 PROCEDURE — 99214 OFFICE O/P EST MOD 30 MIN: CPT | Performed by: FAMILY MEDICINE

## 2020-12-29 RX ORDER — ATORVASTATIN CALCIUM 40 MG/1
40 TABLET, FILM COATED ORAL EVERY EVENING
Qty: 90 TABLET | Refills: 1 | Status: SHIPPED
Start: 2020-12-29 | End: 2021-06-23 | Stop reason: SDUPTHER

## 2020-12-29 RX ORDER — LISINOPRIL 20 MG/1
20 TABLET ORAL DAILY
Qty: 90 TABLET | Refills: 1 | Status: SHIPPED
Start: 2020-12-29 | End: 2021-06-23 | Stop reason: SDUPTHER

## 2020-12-29 SDOH — HEALTH STABILITY: MENTAL HEALTH: HOW OFTEN DO YOU HAVE A DRINK CONTAINING ALCOHOL?: 4 OR MORE TIMES A WEEK

## 2020-12-29 SDOH — HEALTH STABILITY: MENTAL HEALTH: HOW MANY STANDARD DRINKS CONTAINING ALCOHOL DO YOU HAVE ON A TYPICAL DAY?: 5 OR 6

## 2020-12-29 ASSESSMENT — ENCOUNTER SYMPTOMS
CONSTIPATION: 0
BACK PAIN: 0
DIARRHEA: 0
COUGH: 0
VOMITING: 0
SINUS PAIN: 0
CHEST TIGHTNESS: 0
NAUSEA: 0
SHORTNESS OF BREATH: 0
WHEEZING: 0
ABDOMINAL PAIN: 0
SORE THROAT: 0
EYE PAIN: 0

## 2020-12-29 ASSESSMENT — PATIENT HEALTH QUESTIONNAIRE - PHQ9
SUM OF ALL RESPONSES TO PHQ9 QUESTIONS 1 & 2: 0
2. FEELING DOWN, DEPRESSED OR HOPELESS: 0
1. LITTLE INTEREST OR PLEASURE IN DOING THINGS: 0
SUM OF ALL RESPONSES TO PHQ QUESTIONS 1-9: 0

## 2020-12-29 NOTE — PROGRESS NOTES
20    Name: Mir Riner  :1965   Sex:male   Age:55 y.o. Chief Complaint   Patient presents with    Hypertension     Patient presents to office for visit. He did have labs done. Patient decline flu shot. Patient had Covid-19 at the end of November, he says his case was mild, he has no prolonged symptoms or issues. Patient says he drinks pop daily, sometimes more than once a day. He eats a full breakfast typically of eggs, sausage, and toast. Patient eats ham or balogna sandwiches for lunch, he might eat fast food once a month. Patient says that he drinks about 6-8 beers a day, he says this has been increasing. Patient here for follow up on HTn    Blood pressure readings have been good  No changes in meds needed    Labs reviwed however  cholestrol elevated and trigs elevated  He [place] snot watch what he eats at all  Lots of pork and red meat  Lots of bread and pasta  He is now prediabetic  And needs a statin  We had a very long discussion  And he agreed to statin  Will do atorvastatin 40mg daily    Labs in 6 months  Increase exercise  Mediterranean diet    Review of Systems   Constitutional: Negative for appetite change, fatigue and fever. HENT: Negative for congestion, ear pain, hearing loss, sinus pain and sore throat. Eyes: Negative for pain. Respiratory: Negative for cough, chest tightness, shortness of breath and wheezing. Cardiovascular: Negative for chest pain, palpitations and leg swelling. Gastrointestinal: Negative for abdominal pain, constipation, diarrhea, nausea and vomiting. Endocrine: Negative for cold intolerance, heat intolerance, polydipsia, polyphagia and polyuria. Genitourinary: Negative for difficulty urinating, dysuria, frequency, hematuria, scrotal swelling, testicular pain and urgency. Musculoskeletal: Negative for arthralgias, back pain, gait problem, joint swelling and myalgias. Skin: Negative for rash and wound.    Neurological: Negative for dizziness, syncope, light-headedness and headaches. Hematological: Negative for adenopathy. Psychiatric/Behavioral: Negative for confusion, dysphoric mood, self-injury, sleep disturbance and suicidal ideas. The patient is not nervous/anxious. Current Outpatient Medications:     lisinopril (PRINIVIL;ZESTRIL) 20 MG tablet, Take 1 tablet by mouth daily, Disp: 90 tablet, Rfl: 1    atorvastatin (LIPITOR) 40 MG tablet, Take 1 tablet by mouth every evening, Disp: 90 tablet, Rfl: 1  No Known Allergies   Past Medical History:   Diagnosis Date    Fracture     right Malleolus    Hyperlipidemia     Hypertension     Obesity     Pre-diabetes      Patient Active Problem List    Diagnosis Date Noted    Pre-diabetes     Hyperlipidemia 11/18/2019    Displaced fracture of medial malleolus of right tibia 10/01/2019    Sprain of tibiofibular ligament of right ankle 10/01/2019    Closed nondisplaced oblique fracture of shaft of right fibula 10/01/2019    Essential hypertension 04/19/2019      Past Surgical History:   Procedure Laterality Date    ANKLE FRACTURE SURGERY Right 10/2/2019    RIGHT MEDIAL MALLEOLUS OPEN REDUCTION INTERNAL FIXATION, OPEN REDUCTION INTERNAL FIXATION RIGHT SYNDESMOSIS performed by Zainab Wynn MD at [address] [place] [address]      with adenoidectomy      Social History     Tobacco History     Smoking Status  Never Smoker    Smokeless Tobacco Use  Never Used          Alcohol History     Alcohol Use Status  Not Currently          Drug Use     Drug Use Status  Never          Sexual Activity     Sexually Active  Not Asked            /80   Pulse 88   Temp 98.2 °F (36.8 °C)   Ht 6' 1\" (1.854 m)   Wt (!) 373 lb 9.6 oz (169.5 kg)   SpO2 98%   BMI 49.29 kg/m²     EXAM:   Physical Exam  Vitals signs and nursing note reviewed. Constitutional:       Appearance: Normal appearance. He is well-developed. He is obese.    HENT:      Head: Normocephalic and atraumatic. Right Ear: Tympanic membrane normal.      Left Ear: Tympanic membrane normal.      Nose: Nose normal.      Mouth/Throat:      Mouth: Mucous membranes are moist.   Eyes:      Pupils: Pupils are equal, round, and reactive to light. Neck:      Musculoskeletal: Normal range of motion. Cardiovascular:      Rate and Rhythm: Normal rate and regular rhythm. Pulmonary:      Effort: Pulmonary effort is normal.      Breath sounds: Normal breath sounds. Abdominal:      General: Bowel sounds are normal.      Palpations: Abdomen is soft. Musculoskeletal:      Comments: Gait normal in the office today   Skin:     General: Skin is warm and dry. Neurological:      General: No focal deficit present. Mental Status: He is alert and oriented to person, place, and time. Psychiatric:         Mood and Affect: Mood normal.         Thought Content: Thought content normal.          Karen Ortiz was seen today for hypertension. Diagnoses and all orders for this visit:    Essential hypertension  Comments:  stable  readings have been good  no changes  Orders:  -     CBC Auto Differential; Future  -     Comprehensive Metabolic Panel; Future    Mixed hyperlipidemia  Comments:  needs statin  we discussed and he agreed atorvastatin  diet changes  increase movment  Orders:  -     Hemoglobin A1C; Future  -     Lipid Panel; Future    Pre-diabetes  Comments:  mediterranean diet  weight loss  more movement  recheck labs in 6 months  Orders:  -     Hemoglobin A1C; Future    Class 3 severe obesity due to excess calories with serious comorbidity and body mass index (BMI) of 45.0 to 49.9 in adult [facility])  Comments:  as above  we had a long discussion regarding weight, diet and movement  he wants to make changes  Orders:  -     Hemoglobin A1C; Future    Other orders  -     lisinopril (PRINIVIL;ZESTRIL) 20 MG tablet; Take 1 tablet by mouth daily  -     atorvastatin (LIPITOR) 40 MG tablet;  Take 1 tablet by mouth every evening    appt in 6 months  Labs prior to appt      I independently reviewed and updated the chief complaint, HPI, past medical and surgical history, medications, allergies and ROS as entered by the LPN. Seen by:   Jc Sandoval DO

## 2021-06-15 DIAGNOSIS — E66.01 CLASS 3 SEVERE OBESITY DUE TO EXCESS CALORIES WITH SERIOUS COMORBIDITY AND BODY MASS INDEX (BMI) OF 45.0 TO 49.9 IN ADULT (HCC): ICD-10-CM

## 2021-06-15 DIAGNOSIS — R73.03 PRE-DIABETES: ICD-10-CM

## 2021-06-15 DIAGNOSIS — E78.2 MIXED HYPERLIPIDEMIA: ICD-10-CM

## 2021-06-15 DIAGNOSIS — I10 ESSENTIAL HYPERTENSION: ICD-10-CM

## 2021-06-15 LAB
BASOPHILS ABSOLUTE: 0.04 E9/L (ref 0–0.2)
BASOPHILS RELATIVE PERCENT: 0.5 % (ref 0–2)
EOSINOPHILS ABSOLUTE: 0.17 E9/L (ref 0.05–0.5)
EOSINOPHILS RELATIVE PERCENT: 2.3 % (ref 0–6)
HBA1C MFR BLD: 5.6 % (ref 4–5.6)
HCT VFR BLD CALC: 48.1 % (ref 37–54)
HEMOGLOBIN: 15.8 G/DL (ref 12.5–16.5)
IMMATURE GRANULOCYTES #: 0.03 E9/L
IMMATURE GRANULOCYTES %: 0.4 % (ref 0–5)
LYMPHOCYTES ABSOLUTE: 1.79 E9/L (ref 1.5–4)
LYMPHOCYTES RELATIVE PERCENT: 23.8 % (ref 20–42)
MCH RBC QN AUTO: 31.2 PG (ref 26–35)
MCHC RBC AUTO-ENTMCNC: 32.8 % (ref 32–34.5)
MCV RBC AUTO: 95.1 FL (ref 80–99.9)
MONOCYTES ABSOLUTE: 0.62 E9/L (ref 0.1–0.95)
MONOCYTES RELATIVE PERCENT: 8.2 % (ref 2–12)
NEUTROPHILS ABSOLUTE: 4.87 E9/L (ref 1.8–7.3)
NEUTROPHILS RELATIVE PERCENT: 64.8 % (ref 43–80)
PDW BLD-RTO: 12.8 FL (ref 11.5–15)
PLATELET # BLD: 239 E9/L (ref 130–450)
PMV BLD AUTO: 10 FL (ref 7–12)
RBC # BLD: 5.06 E12/L (ref 3.8–5.8)
WBC # BLD: 7.5 E9/L (ref 4.5–11.5)

## 2021-06-16 LAB
ALBUMIN SERPL-MCNC: 4.5 G/DL (ref 3.5–5.2)
ALP BLD-CCNC: 61 U/L (ref 40–129)
ALT SERPL-CCNC: 24 U/L (ref 0–40)
ANION GAP SERPL CALCULATED.3IONS-SCNC: 20 MMOL/L (ref 7–16)
AST SERPL-CCNC: 26 U/L (ref 0–39)
BILIRUB SERPL-MCNC: 1.1 MG/DL (ref 0–1.2)
BUN BLDV-MCNC: 24 MG/DL (ref 6–20)
CALCIUM SERPL-MCNC: 10 MG/DL (ref 8.6–10.2)
CHLORIDE BLD-SCNC: 103 MMOL/L (ref 98–107)
CHOLESTEROL, TOTAL: 177 MG/DL (ref 0–199)
CO2: 19 MMOL/L (ref 22–29)
CREAT SERPL-MCNC: 0.9 MG/DL (ref 0.7–1.2)
GFR AFRICAN AMERICAN: >60
GFR NON-AFRICAN AMERICAN: >60 ML/MIN/1.73
GLUCOSE BLD-MCNC: 71 MG/DL (ref 74–99)
HDLC SERPL-MCNC: 47 MG/DL
LDL CHOLESTEROL CALCULATED: 89 MG/DL (ref 0–99)
POTASSIUM SERPL-SCNC: 4.7 MMOL/L (ref 3.5–5)
SODIUM BLD-SCNC: 142 MMOL/L (ref 132–146)
TOTAL PROTEIN: 7.3 G/DL (ref 6.4–8.3)
TRIGL SERPL-MCNC: 205 MG/DL (ref 0–149)
VLDLC SERPL CALC-MCNC: 41 MG/DL

## 2021-06-23 ENCOUNTER — OFFICE VISIT (OUTPATIENT)
Dept: FAMILY MEDICINE CLINIC | Age: 56
End: 2021-06-23
Payer: COMMERCIAL

## 2021-06-23 VITALS
HEART RATE: 68 BPM | SYSTOLIC BLOOD PRESSURE: 122 MMHG | BODY MASS INDEX: 41.75 KG/M2 | WEIGHT: 315 LBS | OXYGEN SATURATION: 99 % | DIASTOLIC BLOOD PRESSURE: 70 MMHG | HEIGHT: 73 IN | TEMPERATURE: 98.2 F

## 2021-06-23 DIAGNOSIS — Z12.11 SCREEN FOR COLON CANCER: ICD-10-CM

## 2021-06-23 DIAGNOSIS — B35.1 ONYCHOMYCOSIS: ICD-10-CM

## 2021-06-23 DIAGNOSIS — I10 ESSENTIAL HYPERTENSION: ICD-10-CM

## 2021-06-23 DIAGNOSIS — Z00.00 ENCOUNTER FOR WELL ADULT EXAM WITHOUT ABNORMAL FINDINGS: Primary | ICD-10-CM

## 2021-06-23 DIAGNOSIS — E07.9 THYROID DYSFUNCTION: ICD-10-CM

## 2021-06-23 DIAGNOSIS — E78.2 MIXED HYPERLIPIDEMIA: ICD-10-CM

## 2021-06-23 PROCEDURE — 99396 PREV VISIT EST AGE 40-64: CPT | Performed by: FAMILY MEDICINE

## 2021-06-23 RX ORDER — ATORVASTATIN CALCIUM 40 MG/1
40 TABLET, FILM COATED ORAL EVERY EVENING
Qty: 90 TABLET | Refills: 1 | Status: SHIPPED
Start: 2021-06-23 | End: 2022-01-03 | Stop reason: SDUPTHER

## 2021-06-23 RX ORDER — LISINOPRIL 20 MG/1
20 TABLET ORAL DAILY
Qty: 90 TABLET | Refills: 1 | Status: SHIPPED
Start: 2021-06-23 | End: 2021-12-13 | Stop reason: SDUPTHER

## 2021-06-23 RX ORDER — TERBINAFINE HYDROCHLORIDE 250 MG/1
250 TABLET ORAL DAILY
Qty: 90 TABLET | Refills: 1 | Status: SHIPPED
Start: 2021-06-23 | End: 2022-07-12

## 2021-06-23 ASSESSMENT — ENCOUNTER SYMPTOMS
SINUS PAIN: 0
COUGH: 0
SORE THROAT: 0
VOMITING: 0
CHEST TIGHTNESS: 0
ABDOMINAL PAIN: 0
DIARRHEA: 0
CONSTIPATION: 0
SHORTNESS OF BREATH: 0
NAUSEA: 0
WHEEZING: 0
BACK PAIN: 0
EYE PAIN: 0

## 2021-06-23 ASSESSMENT — PATIENT HEALTH QUESTIONNAIRE - PHQ9
SUM OF ALL RESPONSES TO PHQ QUESTIONS 1-9: 0
SUM OF ALL RESPONSES TO PHQ QUESTIONS 1-9: 0
SUM OF ALL RESPONSES TO PHQ9 QUESTIONS 1 & 2: 0
1. LITTLE INTEREST OR PLEASURE IN DOING THINGS: 0
SUM OF ALL RESPONSES TO PHQ QUESTIONS 1-9: 0
2. FEELING DOWN, DEPRESSED OR HOPELESS: 0

## 2021-06-23 NOTE — PROGRESS NOTES
Negative for arthralgias, back pain, gait problem, joint swelling and myalgias. Skin: Negative for rash and wound. Neurological: Negative for dizziness, syncope, light-headedness and headaches. Hematological: Negative for adenopathy. Psychiatric/Behavioral: Negative for confusion, dysphoric mood, self-injury, sleep disturbance and suicidal ideas. The patient is not nervous/anxious.             Current Outpatient Medications:     atorvastatin (LIPITOR) 40 MG tablet, Take 1 tablet by mouth every evening, Disp: 90 tablet, Rfl: 1    lisinopril (PRINIVIL;ZESTRIL) 20 MG tablet, Take 1 tablet by mouth daily, Disp: 90 tablet, Rfl: 1    terbinafine (LAMISIL) 250 MG tablet, Take 1 tablet by mouth daily, Disp: 90 tablet, Rfl: 1  No Known Allergies   Past Medical History:   Diagnosis Date    Fracture     right Malleolus    Hyperlipidemia     Hypertension     Obesity     Pre-diabetes      Patient Active Problem List    Diagnosis Date Noted    Hyperlipidemia 11/18/2019    Displaced fracture of medial malleolus of right tibia 10/01/2019    Sprain of tibiofibular ligament of right ankle 10/01/2019    Closed nondisplaced oblique fracture of shaft of right fibula 10/01/2019    Essential hypertension 04/19/2019      Past Surgical History:   Procedure Laterality Date    ANKLE FRACTURE SURGERY Right 10/2/2019    RIGHT MEDIAL MALLEOLUS OPEN REDUCTION INTERNAL FIXATION, OPEN REDUCTION INTERNAL FIXATION RIGHT SYNDESMOSIS performed by Devika Francisco MD at [address] [place] [address]      with adenoidectomy      Social History     Tobacco History     Smoking Status  Never Smoker    Smokeless Tobacco Use  Never Used          Alcohol History     Alcohol Use Status  Not Currently          Drug Use     Drug Use Status  Never          Sexual Activity     Sexually Active  Not Asked            /70   Pulse 68   Temp 98.2 °F (36.8 °C)   Ht 6' 1\" (1.854 m)   Wt (!) 316 lb 9.6 oz (143.6 kg) SpO2 99%   BMI 41.77 kg/m²     EXAM:   Physical Exam  Vitals and nursing note reviewed. Constitutional:       General: He is not in acute distress. Appearance: Normal appearance. He is well-developed. He is not ill-appearing. Comments: Still over weight but he is down 60 pounds which is great   HENT:      Head: Normocephalic and atraumatic. Right Ear: Tympanic membrane normal.      Left Ear: Tympanic membrane normal.      Nose: Nose normal.      Mouth/Throat:      Mouth: Mucous membranes are moist.   Eyes:      Pupils: Pupils are equal, round, and reactive to light. Cardiovascular:      Rate and Rhythm: Normal rate and regular rhythm. Pulmonary:      Effort: Pulmonary effort is normal.      Breath sounds: Normal breath sounds. Abdominal:      General: Bowel sounds are normal.      Palpations: Abdomen is soft. Musculoskeletal:      Cervical back: Normal range of motion. Comments: Gait steady and well balacned in the office today   Skin:     General: Skin is warm and dry. Comments: Toe nails thick and discolored  Right foot all but the second toe, left foot great toe and a few others   Neurological:      General: No focal deficit present. Mental Status: He is alert and oriented to person, place, and time. Psychiatric:         Mood and Affect: Mood normal.         Thought Content: Thought content normal.          Alfonzo Maxwell was seen today for hypertension and hyperlipidemia. Diagnoses and all orders for this visit:    Encounter for well adult exam without abnormal findings  -     CBC Auto Differential; Future  -     Comprehensive Metabolic Panel;  Future    Essential hypertension  Comments:  well controlled  with weight loss may need to decrease medications  he will watch bp over the next 6 months    Orders:  -     Microalbumin, Ur; Future    Mixed hyperlipidemia  Comments:  much better even with the statin not every day  he has changed his eating habit drasically after last visit  weight down 60pounds  Orders:  -     Lipid Panel; Future    Screen for colon cancer  Comments:  wants to do the cologuard  Orders:  -     Cologuard (For External Results Only); Future    Onychomycosis  Comments:  right foot every toe nail but the second one  will do lamisil for the next 3mos  thsn restart december 1st for 3mos    Thyroid dysfunction  -     TSH without Reflex; Future    Other orders  -     atorvastatin (LIPITOR) 40 MG tablet; Take 1 tablet by mouth every evening  -     lisinopril (PRINIVIL;ZESTRIL) 20 MG tablet; Take 1 tablet by mouth daily  -     terbinafine (LAMISIL) 250 MG tablet; Take 1 tablet by mouth daily        I independently reviewed and updated the chief complaint, HPI, past medical and surgical history, medications, allergies and ROS as entered by the LPN. Seen by:   Don Jolly DO

## 2021-07-13 DIAGNOSIS — Z12.11 SCREEN FOR COLON CANCER: ICD-10-CM

## 2021-12-13 RX ORDER — LISINOPRIL 20 MG/1
20 TABLET ORAL DAILY
Qty: 90 TABLET | Refills: 0 | Status: SHIPPED
Start: 2021-12-13 | End: 2022-01-03 | Stop reason: SDUPTHER

## 2021-12-13 NOTE — TELEPHONE ENCOUNTER
----- Message from [employer] sent at 12/10/2021  2:07 PM EST -----  Subject: Refill Request    QUESTIONS  Name of Medication? lisinopril (PRINIVIL;ZESTRIL) 20 MG tablet  Patient-reported dosage and instructions? once a day  How many days do you have left? 1  [facility]erred Pharmacy? [facility]  Pharmacy phone number (if available)? [phone]  ---------------------------------------------------------------------------  --------------  CALL BACK INFO  What is the best way for the office to contact you? OK to leave message on   voicemail  Preferred Call Back Phone Number?  [phone]

## 2022-01-03 ENCOUNTER — OFFICE VISIT (OUTPATIENT)
Dept: FAMILY MEDICINE CLINIC | Age: 57
End: 2022-01-03
Payer: COMMERCIAL

## 2022-01-03 VITALS
SYSTOLIC BLOOD PRESSURE: 136 MMHG | TEMPERATURE: 98.2 F | BODY MASS INDEX: 41.75 KG/M2 | HEART RATE: 78 BPM | OXYGEN SATURATION: 99 % | DIASTOLIC BLOOD PRESSURE: 84 MMHG | WEIGHT: 315 LBS | HEIGHT: 73 IN

## 2022-01-03 DIAGNOSIS — I10 ESSENTIAL HYPERTENSION: Primary | ICD-10-CM

## 2022-01-03 DIAGNOSIS — E78.2 MIXED HYPERLIPIDEMIA: ICD-10-CM

## 2022-01-03 PROCEDURE — 99214 OFFICE O/P EST MOD 30 MIN: CPT | Performed by: FAMILY MEDICINE

## 2022-01-03 RX ORDER — LISINOPRIL 20 MG/1
20 TABLET ORAL DAILY
Qty: 90 TABLET | Refills: 1 | Status: SHIPPED
Start: 2022-01-03 | End: 2022-07-12 | Stop reason: SDUPTHER

## 2022-01-03 RX ORDER — ATORVASTATIN CALCIUM 40 MG/1
40 TABLET, FILM COATED ORAL EVERY EVENING
Qty: 90 TABLET | Refills: 1 | Status: SHIPPED
Start: 2022-01-03 | End: 2022-07-12

## 2022-01-03 ASSESSMENT — ENCOUNTER SYMPTOMS
EYE PAIN: 0
VOMITING: 0
COUGH: 0
CHEST TIGHTNESS: 0
CONSTIPATION: 0
SHORTNESS OF BREATH: 0
SORE THROAT: 0
DIARRHEA: 0
WHEEZING: 0
SINUS PAIN: 0
ABDOMINAL PAIN: 0
NAUSEA: 0
BACK PAIN: 0

## 2022-01-03 ASSESSMENT — PATIENT HEALTH QUESTIONNAIRE - PHQ9
1. LITTLE INTEREST OR PLEASURE IN DOING THINGS: 0
SUM OF ALL RESPONSES TO PHQ QUESTIONS 1-9: 0
2. FEELING DOWN, DEPRESSED OR HOPELESS: 0
SUM OF ALL RESPONSES TO PHQ QUESTIONS 1-9: 0
SUM OF ALL RESPONSES TO PHQ9 QUESTIONS 1 & 2: 0

## 2022-01-03 NOTE — PROGRESS NOTES
1/3/22    Name: Prachi Christensen  :1965   Sex:male   Age:56 y.o. Chief Complaint   Patient presents with    Hypertension    Hyperlipidemia     Patient presents to office for visit. He did not do labs before appointment. Patient says he hasn't been watching his diet as well, but is starting back on it tomorrow. He did get first covid shot. Patient has just started on three months of Lamisil again after a three month break. Patient recently got a blood pressure cuff to start checking his blood pressure. Patient here for a check up  He has been doing well    HTN has been stable  He has been checking it at home and readings are good    He fell of fthe wagon with regards to diet in the last few weeks and weight went up a little  But they are restarting everything again this week  Whole foods, no sugar  They were very successful before    Has gotten first covid shot and booster tomorrow  He figures work will be requiring soon any way    Labs are due to be done      Review of Systems   Constitutional: Negative for appetite change, fatigue and fever. HENT: Negative for congestion, ear pain, hearing loss, sinus pain and sore throat. Eyes: Negative for pain. Respiratory: Negative for cough, chest tightness, shortness of breath and wheezing. Cardiovascular: Negative for chest pain, palpitations and leg swelling. Gastrointestinal: Negative for abdominal pain, constipation, diarrhea, nausea and vomiting. Endocrine: Negative for cold intolerance and heat intolerance. Genitourinary: Negative for difficulty urinating, dysuria, frequency, hematuria, scrotal swelling, testicular pain and urgency. Musculoskeletal: Negative for arthralgias, back pain, gait problem, joint swelling and myalgias. Skin: Negative for rash and wound. Neurological: Negative for dizziness, syncope, light-headedness and headaches. Hematological: Negative for adenopathy.    Psychiatric/Behavioral: Negative for confusion, dysphoric mood, self-injury, sleep disturbance and suicidal ideas. The patient is not nervous/anxious. Current Outpatient Medications:     lisinopril (PRINIVIL;ZESTRIL) 20 MG tablet, Take 1 tablet by mouth daily, Disp: 90 tablet, Rfl: 1    atorvastatin (LIPITOR) 40 MG tablet, Take 1 tablet by mouth every evening, Disp: 90 tablet, Rfl: 1    terbinafine (LAMISIL) 250 MG tablet, Take 1 tablet by mouth daily, Disp: 90 tablet, Rfl: 1  No Known Allergies   Past Medical History:   Diagnosis Date    Fracture     right Malleolus    Hyperlipidemia     Hypertension     Obesity     Pre-diabetes      Patient Active Problem List    Diagnosis Date Noted    Hyperlipidemia 11/18/2019    Displaced fracture of medial malleolus of right tibia 10/01/2019    Sprain of tibiofibular ligament of right ankle 10/01/2019    Closed nondisplaced oblique fracture of shaft of right fibula 10/01/2019    Essential hypertension 04/19/2019      Past Surgical History:   Procedure Laterality Date    ANKLE FRACTURE SURGERY Right 10/2/2019    RIGHT MEDIAL MALLEOLUS OPEN REDUCTION INTERNAL FIXATION, OPEN REDUCTION INTERNAL FIXATION RIGHT SYNDESMOSIS performed by Veronica Louie MD at [address] [place] [address]      with adenoidectomy      Social History     Tobacco History     Smoking Status  Never Smoker    Smokeless Tobacco Use  Never Used          Alcohol History     Alcohol Use Status  Not Currently          Drug Use     Drug Use Status  Never          Sexual Activity     Sexually Active  Not Asked            /84   Pulse 78   Temp 98.2 °F (36.8 °C)   Ht 6' 1\" (1.854 m)   Wt (!) 334 lb (151.5 kg)   SpO2 99%   BMI 44.07 kg/m²     EXAM:   Physical Exam  Vitals and nursing note reviewed. Constitutional:       General: He is not in acute distress. Appearance: He is well-developed. He is obese. He is not ill-appearing. HENT:      Head: Normocephalic and atraumatic.       Right Ear: Tympanic membrane normal.      Left Ear: Tympanic membrane normal.      Nose: Nose normal.      Mouth/Throat:      Mouth: Mucous membranes are moist.   Eyes:      Pupils: Pupils are equal, round, and reactive to light. Cardiovascular:      Rate and Rhythm: Normal rate and regular rhythm. Pulmonary:      Effort: Pulmonary effort is normal.      Breath sounds: Normal breath sounds. Abdominal:      General: Bowel sounds are normal.      Palpations: Abdomen is soft. Musculoskeletal:      Cervical back: Normal range of motion. Skin:     General: Skin is warm and dry. Neurological:      Mental Status: He is alert and oriented to person, place, and time. Mental status is at baseline. Psychiatric:         Mood and Affect: Mood normal.         Thought Content: Thought content normal.          Olimpia Welch was seen today for hypertension and hyperlipidemia. Diagnoses and all orders for this visit:    Essential hypertension  Comments:  well controlled  doing good, watching diet again  trying to get weight back down    Mixed hyperlipidemia  Comments:  on statin  labs labs were good  he is back on diet as of this week    Other orders  -     lisinopril (PRINIVIL;ZESTRIL) 20 MG tablet; Take 1 tablet by mouth daily  -     atorvastatin (LIPITOR) 40 MG tablet; Take 1 tablet by mouth every evening        I independently reviewed and updated the chief complaint, HPI, past medical and surgical history, medications, allergies and ROS as entered by the LPN. Seen by:   Dante Hou DO

## 2022-07-12 ENCOUNTER — OFFICE VISIT (OUTPATIENT)
Dept: FAMILY MEDICINE CLINIC | Age: 57
End: 2022-07-12
Payer: COMMERCIAL

## 2022-07-12 VITALS
TEMPERATURE: 98.2 F | HEIGHT: 73 IN | SYSTOLIC BLOOD PRESSURE: 126 MMHG | BODY MASS INDEX: 41.75 KG/M2 | WEIGHT: 315 LBS | OXYGEN SATURATION: 99 % | DIASTOLIC BLOOD PRESSURE: 88 MMHG | HEART RATE: 82 BPM

## 2022-07-12 DIAGNOSIS — E78.2 MIXED HYPERLIPIDEMIA: ICD-10-CM

## 2022-07-12 DIAGNOSIS — L60.0 INGROWN NAIL: ICD-10-CM

## 2022-07-12 DIAGNOSIS — Z00.00 ENCOUNTER FOR WELL ADULT EXAM WITHOUT ABNORMAL FINDINGS: Primary | ICD-10-CM

## 2022-07-12 DIAGNOSIS — I10 ESSENTIAL HYPERTENSION: ICD-10-CM

## 2022-07-12 PROCEDURE — 99396 PREV VISIT EST AGE 40-64: CPT | Performed by: FAMILY MEDICINE

## 2022-07-12 RX ORDER — LISINOPRIL 20 MG/1
20 TABLET ORAL DAILY
Qty: 90 TABLET | Refills: 1 | Status: SHIPPED | OUTPATIENT
Start: 2022-07-12

## 2022-07-12 ASSESSMENT — ENCOUNTER SYMPTOMS
SHORTNESS OF BREATH: 0
VOMITING: 0
DIARRHEA: 0
BACK PAIN: 0
SINUS PAIN: 0
COUGH: 0
WHEEZING: 0
ABDOMINAL PAIN: 0
CONSTIPATION: 0
EYE PAIN: 0
NAUSEA: 0
SORE THROAT: 0
CHEST TIGHTNESS: 0

## 2022-07-12 NOTE — PROGRESS NOTES
22    Name: Esvin Santos  :1965   Sex:male   Age:56 y.o. Chief Complaint   Patient presents with    Hypertension    Hyperlipidemia     Patient presents to office for visit. He has not had his labs done because the order . Patient has not been taking Atorvastatin for the past six months. He has put weight on and taken it off and put it on. Patient is starting to cut out sugar in his drinks and condiments. Patient did stop his Lisinopril as well, but he was checking his blood pressure and went back on it. Here for a check up  Needs labs done this week or next    HTN  Readings good  He stopped meds a while ago and it went right back up into the 480-372 systolic range  Needs to stay on it unless he starts to lose weight again  Recommend he try but also more movement  Low salt diet    Lipids  Needs labs done  He stopped th atorvastatin to see how his diet was doing  He changed something  Lbs next week    Discussed weight  He is trying to get back on his diet and goet some weight off, not ready for bariatrics referral    Right toe nail   Skin swollen and pain whenhe wears boots  Will refer for ingrown nail        Review of Systems   Constitutional: Negative for appetite change, fatigue and fever. HENT: Negative for congestion, ear pain, hearing loss, sinus pain and sore throat. Eyes: Negative for pain. Respiratory: Negative for cough, chest tightness, shortness of breath and wheezing. Cardiovascular: Negative for chest pain, palpitations and leg swelling. Gastrointestinal: Negative for abdominal pain, constipation, diarrhea, nausea and vomiting. Endocrine: Negative for cold intolerance and heat intolerance. Genitourinary: Negative for difficulty urinating, hematuria, scrotal swelling, testicular pain and urgency. Musculoskeletal: Negative for arthralgias, back pain, gait problem, joint swelling and myalgias. Skin: Negative for rash and wound.    Neurological: Negative for dizziness, syncope, light-headedness and headaches. Hematological: Negative for adenopathy. Psychiatric/Behavioral: Negative for confusion, dysphoric mood, self-injury, sleep disturbance and suicidal ideas. The patient is not nervous/anxious. Current Outpatient Medications:     lisinopril (PRINIVIL;ZESTRIL) 20 MG tablet, Take 1 tablet by mouth daily, Disp: 90 tablet, Rfl: 1  No Known Allergies   Past Medical History:   Diagnosis Date    Fracture     right Malleolus    Hyperlipidemia     Hypertension     Obesity     Pre-diabetes      Patient Active Problem List    Diagnosis Date Noted    Hyperlipidemia 11/18/2019    Displaced fracture of medial malleolus of right tibia 10/01/2019    Sprain of tibiofibular ligament of right ankle 10/01/2019    Closed nondisplaced oblique fracture of shaft of right fibula 10/01/2019    Essential hypertension 04/19/2019      Past Surgical History:   Procedure Laterality Date    ANKLE FRACTURE SURGERY Right 10/2/2019    RIGHT MEDIAL MALLEOLUS OPEN REDUCTION INTERNAL FIXATION, OPEN REDUCTION INTERNAL FIXATION RIGHT SYNDESMOSIS performed by Bruno Severin, MD at [address] [place] [address]      with adenoidectomy      Social History     Tobacco History     Smoking Status  Never Smoker    Smokeless Tobacco Use  Never Used          Alcohol History     Alcohol Use Status  Not Currently          Drug Use     Drug Use Status  Never          Sexual Activity     Sexually Active  Not Asked            /88   Pulse 82   Temp 98.2 °F (36.8 °C)   Ht 6' 1\" (1.854 m)   Wt (!) 338 lb 3.2 oz (153.4 kg)   SpO2 99%   BMI 44.62 kg/m²     EXAM:   Physical Exam  Vitals and nursing note reviewed. Constitutional:       General: He is not in acute distress. Appearance: He is well-developed. He is not ill-appearing. HENT:      Head: Normocephalic and atraumatic.       Right Ear: Tympanic membrane normal.      Left Ear: Tympanic membrane normal.      Nose: Nose normal.      Mouth/Throat:      Mouth: Mucous membranes are moist.   Eyes:      Pupils: Pupils are equal, round, and reactive to light. Cardiovascular:      Rate and Rhythm: Normal rate and regular rhythm. Pulmonary:      Effort: Pulmonary effort is normal.      Breath sounds: Normal breath sounds. Musculoskeletal:      Cervical back: Normal range of motion. Comments: Gait steady in the office today   Skin:     General: Skin is warm and dry. Neurological:      Mental Status: He is alert and oriented to person, place, and time. Mental status is at baseline. Psychiatric:         Mood and Affect: Mood normal.         Thought Content: Thought content normal.          Staci Li was seen today for hypertension and hyperlipidemia. Diagnoses and all orders for this visit:    Encounter for well adult exam without abnormal findings  -     CBC with Auto Differential; Future  -     Comprehensive Metabolic Panel; Future  -     Hemoglobin A1C; Future  -     Microalbumin, Ur; Future  -     Lipid Panel; Future  -     TSH; Future  -     PSA Screening; Future    Essential hypertension  Comments:  stopped it a few months ago and his bp went up so he restarted  okay today   diatolic is a little high, he takes it every day  counseled weight loss more moveme  Orders:  -     lisinopril (PRINIVIL;ZESTRIL) 20 MG tablet; Take 1 tablet by mouth daily    Ingrown nail  Comments:  refer to DR hSweta Sun  took all 6 months of terbinafine  Orders:  -     Misti Bautista DPM, Podiatry, [place]    Mixed hyperlipidemia  Comments:  he stopped atorvastatin  has bene trying to watch diet  will get labs to see where he is        I independently reviewed and updated the chief complaint, HPI, past medical and surgical history, medications, allergies and ROS as entered by the LPN. Seen by:   Jackie Huynh DO

## 2022-07-14 ENCOUNTER — APPOINTMENT (OUTPATIENT)
Dept: GENERAL RADIOLOGY | Age: 57
DRG: 571 | End: 2022-07-14
Payer: COMMERCIAL

## 2022-07-14 ENCOUNTER — HOSPITAL ENCOUNTER (INPATIENT)
Age: 57
LOS: 1 days | Discharge: HOME | DRG: 571 | End: 2022-07-16
Attending: EMERGENCY MEDICINE | Admitting: INTERNAL MEDICINE
Payer: COMMERCIAL

## 2022-07-14 ENCOUNTER — OFFICE VISIT (OUTPATIENT)
Dept: PODIATRY | Age: 57
End: 2022-07-14
Payer: COMMERCIAL

## 2022-07-14 VITALS — BODY MASS INDEX: 41.75 KG/M2 | HEIGHT: 73 IN | WEIGHT: 315 LBS

## 2022-07-14 DIAGNOSIS — B87.9 MAGGOT INFESTATION: Primary | ICD-10-CM

## 2022-07-14 DIAGNOSIS — B87.9 INFESTATION BY MAGGOTS: Primary | ICD-10-CM

## 2022-07-14 DIAGNOSIS — L60.0 INGROWN NAIL: ICD-10-CM

## 2022-07-14 DIAGNOSIS — L03.031 CELLULITIS OF RIGHT TOE: ICD-10-CM

## 2022-07-14 DIAGNOSIS — L08.9 TOE INFECTION: ICD-10-CM

## 2022-07-14 DIAGNOSIS — Z00.00 ENCOUNTER FOR WELL ADULT EXAM WITHOUT ABNORMAL FINDINGS: ICD-10-CM

## 2022-07-14 LAB
ALBUMIN SERPL-MCNC: 4.6 G/DL (ref 3.5–5.2)
ALBUMIN SERPL-MCNC: 4.8 G/DL (ref 3.5–5.2)
ALP BLD-CCNC: 54 U/L (ref 40–129)
ALP BLD-CCNC: 60 U/L (ref 40–129)
ALT SERPL-CCNC: 25 U/L (ref 0–40)
ALT SERPL-CCNC: 25 U/L (ref 0–40)
ANION GAP SERPL CALCULATED.3IONS-SCNC: 16 MMOL/L (ref 7–16)
ANION GAP SERPL CALCULATED.3IONS-SCNC: 19 MMOL/L (ref 7–16)
AST SERPL-CCNC: 20 U/L (ref 0–39)
AST SERPL-CCNC: 21 U/L (ref 0–39)
BASOPHILS ABSOLUTE: 0.04 E9/L (ref 0–0.2)
BASOPHILS ABSOLUTE: 0.05 E9/L (ref 0–0.2)
BASOPHILS RELATIVE PERCENT: 0.6 % (ref 0–2)
BASOPHILS RELATIVE PERCENT: 0.7 % (ref 0–2)
BILIRUB SERPL-MCNC: 0.5 MG/DL (ref 0–1.2)
BILIRUB SERPL-MCNC: 1 MG/DL (ref 0–1.2)
BUN BLDV-MCNC: 22 MG/DL (ref 6–20)
BUN BLDV-MCNC: 25 MG/DL (ref 6–20)
CALCIUM SERPL-MCNC: 9.5 MG/DL (ref 8.6–10.2)
CALCIUM SERPL-MCNC: 9.5 MG/DL (ref 8.6–10.2)
CHLORIDE BLD-SCNC: 103 MMOL/L (ref 98–107)
CHLORIDE BLD-SCNC: 98 MMOL/L (ref 98–107)
CHOLESTEROL, TOTAL: 234 MG/DL (ref 0–199)
CO2: 18 MMOL/L (ref 22–29)
CO2: 20 MMOL/L (ref 22–29)
CREAT SERPL-MCNC: 1 MG/DL (ref 0.7–1.2)
CREAT SERPL-MCNC: 1.1 MG/DL (ref 0.7–1.2)
EOSINOPHILS ABSOLUTE: 0.11 E9/L (ref 0.05–0.5)
EOSINOPHILS ABSOLUTE: 0.13 E9/L (ref 0.05–0.5)
EOSINOPHILS RELATIVE PERCENT: 1.6 % (ref 0–6)
EOSINOPHILS RELATIVE PERCENT: 1.9 % (ref 0–6)
GFR AFRICAN AMERICAN: >60
GFR AFRICAN AMERICAN: >60
GFR NON-AFRICAN AMERICAN: >60 ML/MIN/1.73
GFR NON-AFRICAN AMERICAN: >60 ML/MIN/1.73
GLUCOSE BLD-MCNC: 100 MG/DL (ref 74–99)
GLUCOSE BLD-MCNC: 88 MG/DL (ref 74–99)
HBA1C MFR BLD: 5.6 % (ref 4–5.6)
HCT VFR BLD CALC: 46.9 % (ref 37–54)
HCT VFR BLD CALC: 48 % (ref 37–54)
HDLC SERPL-MCNC: 48 MG/DL
HEMOGLOBIN: 16.2 G/DL (ref 12.5–16.5)
HEMOGLOBIN: 16.7 G/DL (ref 12.5–16.5)
IMMATURE GRANULOCYTES #: 0.03 E9/L
IMMATURE GRANULOCYTES #: 0.03 E9/L
IMMATURE GRANULOCYTES %: 0.4 % (ref 0–5)
IMMATURE GRANULOCYTES %: 0.4 % (ref 0–5)
LACTIC ACID: 2 MMOL/L (ref 0.5–2.2)
LDL CHOLESTEROL CALCULATED: 126 MG/DL (ref 0–99)
LYMPHOCYTES ABSOLUTE: 1.86 E9/L (ref 1.5–4)
LYMPHOCYTES ABSOLUTE: 2.27 E9/L (ref 1.5–4)
LYMPHOCYTES RELATIVE PERCENT: 27.2 % (ref 20–42)
LYMPHOCYTES RELATIVE PERCENT: 33.6 % (ref 20–42)
MCH RBC QN AUTO: 32.3 PG (ref 26–35)
MCH RBC QN AUTO: 33 PG (ref 26–35)
MCHC RBC AUTO-ENTMCNC: 33.8 % (ref 32–34.5)
MCHC RBC AUTO-ENTMCNC: 35.6 % (ref 32–34.5)
MCV RBC AUTO: 92.7 FL (ref 80–99.9)
MCV RBC AUTO: 95.6 FL (ref 80–99.9)
MONOCYTES ABSOLUTE: 0.62 E9/L (ref 0.1–0.95)
MONOCYTES ABSOLUTE: 0.64 E9/L (ref 0.1–0.95)
MONOCYTES RELATIVE PERCENT: 9.2 % (ref 2–12)
MONOCYTES RELATIVE PERCENT: 9.4 % (ref 2–12)
NEUTROPHILS ABSOLUTE: 3.67 E9/L (ref 1.8–7.3)
NEUTROPHILS ABSOLUTE: 4.14 E9/L (ref 1.8–7.3)
NEUTROPHILS RELATIVE PERCENT: 54.3 % (ref 43–80)
NEUTROPHILS RELATIVE PERCENT: 60.7 % (ref 43–80)
PDW BLD-RTO: 11.8 FL (ref 11.5–15)
PDW BLD-RTO: 12.2 FL (ref 11.5–15)
PLATELET # BLD: 235 E9/L (ref 130–450)
PLATELET # BLD: 236 E9/L (ref 130–450)
PMV BLD AUTO: 9.3 FL (ref 7–12)
PMV BLD AUTO: 9.9 FL (ref 7–12)
POTASSIUM REFLEX MAGNESIUM: 4 MMOL/L (ref 3.5–5)
POTASSIUM SERPL-SCNC: 4.1 MMOL/L (ref 3.5–5)
PROSTATE SPECIFIC ANTIGEN: 1.12 NG/ML (ref 0–4)
RBC # BLD: 5.02 E12/L (ref 3.8–5.8)
RBC # BLD: 5.06 E12/L (ref 3.8–5.8)
SODIUM BLD-SCNC: 134 MMOL/L (ref 132–146)
SODIUM BLD-SCNC: 140 MMOL/L (ref 132–146)
TOTAL PROTEIN: 7.3 G/DL (ref 6.4–8.3)
TOTAL PROTEIN: 7.5 G/DL (ref 6.4–8.3)
TRIGL SERPL-MCNC: 298 MG/DL (ref 0–149)
TSH SERPL DL<=0.05 MIU/L-ACNC: 1.61 UIU/ML (ref 0.27–4.2)
VLDLC SERPL CALC-MCNC: 60 MG/DL
WBC # BLD: 6.8 E9/L (ref 4.5–11.5)
WBC # BLD: 6.8 E9/L (ref 4.5–11.5)

## 2022-07-14 PROCEDURE — 73630 X-RAY EXAM OF FOOT: CPT

## 2022-07-14 PROCEDURE — 87040 BLOOD CULTURE FOR BACTERIA: CPT

## 2022-07-14 PROCEDURE — 99285 EMERGENCY DEPT VISIT HI MDM: CPT

## 2022-07-14 PROCEDURE — 99203 OFFICE O/P NEW LOW 30 MIN: CPT | Performed by: PODIATRIST

## 2022-07-14 PROCEDURE — 86140 C-REACTIVE PROTEIN: CPT

## 2022-07-14 PROCEDURE — 83605 ASSAY OF LACTIC ACID: CPT

## 2022-07-14 PROCEDURE — 80053 COMPREHEN METABOLIC PANEL: CPT

## 2022-07-14 PROCEDURE — 85651 RBC SED RATE NONAUTOMATED: CPT

## 2022-07-14 PROCEDURE — 85025 COMPLETE CBC W/AUTO DIFF WBC: CPT

## 2022-07-14 ASSESSMENT — PAIN DESCRIPTION - ORIENTATION: ORIENTATION: RIGHT

## 2022-07-14 ASSESSMENT — PAIN DESCRIPTION - PAIN TYPE: TYPE: ACUTE PAIN

## 2022-07-14 ASSESSMENT — PAIN SCALES - GENERAL: PAINLEVEL_OUTOF10: 3

## 2022-07-14 ASSESSMENT — PAIN DESCRIPTION - DESCRIPTORS: DESCRIPTORS: ACHING

## 2022-07-14 ASSESSMENT — PAIN DESCRIPTION - LOCATION: LOCATION: TOE (COMMENT WHICH ONE)

## 2022-07-14 ASSESSMENT — PAIN - FUNCTIONAL ASSESSMENT: PAIN_FUNCTIONAL_ASSESSMENT: 0-10

## 2022-07-14 ASSESSMENT — PAIN DESCRIPTION - FREQUENCY: FREQUENCY: CONTINUOUS

## 2022-07-14 NOTE — PROGRESS NOTES
Patient in office with c/o painful toenail right great toenail. Enid Pretty DO last seen 2022.     22  Hardeep Sprawls : 1965 Sex: male  Age: 61 y.o. Patient was referred by Enid Pretty DO    CC:    Ingrown right great toenail  History nail fungus    HPI:   This pleasant 58-year-old male patient referred to me today for history of an ingrown right great toenail. Was treated several times with oral Lamisil for nail fungus. States has noted significant improvement. States his right great toenail has been ingrown with some redness for the past year. No recent injury or trauma. Does work on his feet. Does have occasional tenderness on the inside and outside the right great toenail. No additional pedal complaints at this time. ROS:  Const: Denies constitutional symptoms  Musculo: Denies symptoms other than stated above  Skin: Denies symptoms other than stated above       Current Outpatient Medications:     lisinopril (PRINIVIL;ZESTRIL) 20 MG tablet, Take 1 tablet by mouth daily, Disp: 90 tablet, Rfl: 1  No Known Allergies    Past Medical History:   Diagnosis Date    Fracture     right Malleolus    Hyperlipidemia     Hypertension     Obesity     Pre-diabetes            Vitals:    22 1332   Weight: (!) 338 lb (153.3 kg)   Height: 6' 1\" (1.854 m)       Work History/Social History: Foot and ankle history:     Focused Lower Extremity Physical Exam:    Neurovascular examination:    Dorsalis Pedis palpable bilateral.  Posterior tibialis palpable bilateral.    Capillary Refill Time:  Immediate return  Hair growth:  Symmetrical and bilateral   Skin:  Not atrophic  Edema: No edema bilateral feet or ankles.   Neurologic:  Light touch intact bilateral.      Musculoskeletal/ Orthopedic examination:    Equinis: Absent bilateral  Dorsiflexion, plantarflexion, inversion, eversion bilateral 5 out of 5 muscle strength  Wiggling toes  Negative Homans  Mild tenderness medial and lateral border right great toenail. Dermatology examination:    Mild edema medial lateral border right great toenail with incurvated nail. Bilateral great toenail thickened and mycotic. Right great toenail is intact. No drainage noted. Multiple maggots noted underlying right great toenail. Wound does not track to bone or probe to bone. Assessment and Plan:  Jhoana Lorenz was seen today for ingrown toenail. Diagnoses and all orders for this visit:    Infestation by maggots    Ingrown nail    Cellulitis of right toe        Les Hart is seen new referral today ingrown right great toenail. Does have significant amount of maggots underlying right great toenail. Previously treated with oral Lamisil and states toenail has look like this for a year. Concern for underlying infection especially with maggot infestation noted. Did recommend going to the emergency room for likely antibiotics and radiographs. Hopefully the emergency room is able to flush out the maggots. He states he is able to transport himself directly to the emergency room which I did recommend going to immediately. Appreciate their input. I will follow-up likely in 1 week. Return in about 1 week (around 7/21/2022). Seen By:  Joann Pedersen DPM      Document was created using voice recognition software. Note was reviewed, however may contain grammatical errors.

## 2022-07-14 NOTE — LETTER
[address] [place] [address]  Phone: [phone]    No name on file. July 16, 2022     Patient: Tricia Yang   YOB: 1965   Date of Visit: 7/14/2022       To Whom It May Concern: It is my medical opinion that Fabiano Eastman, can return back to work on 7/20/2022    If you have any questions or concerns, please don't hesitate to call.     Sincerely,        ERNESTO Crowder

## 2022-07-15 ENCOUNTER — ANESTHESIA (OUTPATIENT)
Dept: OPERATING ROOM | Age: 57
DRG: 571 | End: 2022-07-15
Payer: COMMERCIAL

## 2022-07-15 ENCOUNTER — APPOINTMENT (OUTPATIENT)
Dept: ULTRASOUND IMAGING | Age: 57
DRG: 571 | End: 2022-07-15
Payer: COMMERCIAL

## 2022-07-15 ENCOUNTER — ANESTHESIA EVENT (OUTPATIENT)
Dept: OPERATING ROOM | Age: 57
DRG: 571 | End: 2022-07-15
Payer: COMMERCIAL

## 2022-07-15 PROBLEM — B87.9 MAGGOT INFESTATION: Status: ACTIVE | Noted: 2022-07-15

## 2022-07-15 PROBLEM — L60.0 INGROWING NAIL: Status: ACTIVE | Noted: 2022-07-15

## 2022-07-15 PROBLEM — L03.031 CELLULITIS OF RIGHT TOE: Status: ACTIVE | Noted: 2022-07-15

## 2022-07-15 PROBLEM — L08.9 TOE INFECTION: Status: ACTIVE | Noted: 2022-07-15

## 2022-07-15 LAB
C-REACTIVE PROTEIN: 0.3 MG/DL (ref 0–0.4)
SEDIMENTATION RATE, ERYTHROCYTE: 2 MM/HR (ref 0–15)

## 2022-07-15 PROCEDURE — 93970 EXTREMITY STUDY: CPT

## 2022-07-15 PROCEDURE — 2580000003 HC RX 258: Performed by: NURSE ANESTHETIST, CERTIFIED REGISTERED

## 2022-07-15 PROCEDURE — 3700000000 HC ANESTHESIA ATTENDED CARE: Performed by: PODIATRIST

## 2022-07-15 PROCEDURE — 3600000002 HC SURGERY LEVEL 2 BASE: Performed by: PODIATRIST

## 2022-07-15 PROCEDURE — 6360000002 HC RX W HCPCS: Performed by: INTERNAL MEDICINE

## 2022-07-15 PROCEDURE — 0JBQ0ZZ EXCISION OF RIGHT FOOT SUBCUTANEOUS TISSUE AND FASCIA, OPEN APPROACH: ICD-10-PCS | Performed by: STUDENT IN AN ORGANIZED HEALTH CARE EDUCATION/TRAINING PROGRAM

## 2022-07-15 PROCEDURE — 3700000001 HC ADD 15 MINUTES (ANESTHESIA): Performed by: PODIATRIST

## 2022-07-15 PROCEDURE — 88312 SPECIAL STAINS GROUP 1: CPT

## 2022-07-15 PROCEDURE — G0378 HOSPITAL OBSERVATION PER HR: HCPCS

## 2022-07-15 PROCEDURE — 6370000000 HC RX 637 (ALT 250 FOR IP): Performed by: STUDENT IN AN ORGANIZED HEALTH CARE EDUCATION/TRAINING PROGRAM

## 2022-07-15 PROCEDURE — 7100000011 HC PHASE II RECOVERY - ADDTL 15 MIN: Performed by: PODIATRIST

## 2022-07-15 PROCEDURE — 6360000002 HC RX W HCPCS: Performed by: NURSE ANESTHETIST, CERTIFIED REGISTERED

## 2022-07-15 PROCEDURE — 0HDRXZZ EXTRACTION OF TOE NAIL, EXTERNAL APPROACH: ICD-10-PCS | Performed by: STUDENT IN AN ORGANIZED HEALTH CARE EDUCATION/TRAINING PROGRAM

## 2022-07-15 PROCEDURE — 99232 SBSQ HOSP IP/OBS MODERATE 35: CPT | Performed by: STUDENT IN AN ORGANIZED HEALTH CARE EDUCATION/TRAINING PROGRAM

## 2022-07-15 PROCEDURE — 1200000000 HC SEMI PRIVATE

## 2022-07-15 PROCEDURE — 11730 AVULSION NAIL PLATE SIMPLE 1: CPT | Performed by: PODIATRIST

## 2022-07-15 PROCEDURE — 2580000003 HC RX 258: Performed by: INTERNAL MEDICINE

## 2022-07-15 PROCEDURE — 6370000000 HC RX 637 (ALT 250 FOR IP): Performed by: INTERNAL MEDICINE

## 2022-07-15 PROCEDURE — 88302 TISSUE EXAM BY PATHOLOGIST: CPT

## 2022-07-15 PROCEDURE — 2500000003 HC RX 250 WO HCPCS: Performed by: PODIATRIST

## 2022-07-15 PROCEDURE — 2709999900 HC NON-CHARGEABLE SUPPLY: Performed by: PODIATRIST

## 2022-07-15 PROCEDURE — 6360000002 HC RX W HCPCS: Performed by: EMERGENCY MEDICINE

## 2022-07-15 PROCEDURE — 96372 THER/PROPH/DIAG INJ SC/IM: CPT

## 2022-07-15 PROCEDURE — 2580000003 HC RX 258: Performed by: EMERGENCY MEDICINE

## 2022-07-15 PROCEDURE — 7100000010 HC PHASE II RECOVERY - FIRST 15 MIN: Performed by: PODIATRIST

## 2022-07-15 PROCEDURE — 99223 1ST HOSP IP/OBS HIGH 75: CPT | Performed by: PODIATRIST

## 2022-07-15 PROCEDURE — 99222 1ST HOSP IP/OBS MODERATE 55: CPT | Performed by: INTERNAL MEDICINE

## 2022-07-15 PROCEDURE — 3600000012 HC SURGERY LEVEL 2 ADDTL 15MIN: Performed by: PODIATRIST

## 2022-07-15 RX ORDER — PROPOFOL 10 MG/ML
INJECTION, EMULSION INTRAVENOUS CONTINUOUS PRN
Status: DISCONTINUED | OUTPATIENT
Start: 2022-07-15 | End: 2022-07-15 | Stop reason: SDUPTHER

## 2022-07-15 RX ORDER — SODIUM CHLORIDE 0.9 % (FLUSH) 0.9 %
5-40 SYRINGE (ML) INJECTION EVERY 12 HOURS SCHEDULED
Status: DISCONTINUED | OUTPATIENT
Start: 2022-07-15 | End: 2022-07-16 | Stop reason: HOSPADM

## 2022-07-15 RX ORDER — ONDANSETRON 4 MG/1
4 TABLET, ORALLY DISINTEGRATING ORAL EVERY 8 HOURS PRN
Status: DISCONTINUED | OUTPATIENT
Start: 2022-07-15 | End: 2022-07-16 | Stop reason: HOSPADM

## 2022-07-15 RX ORDER — SULFAMETHOXAZOLE AND TRIMETHOPRIM 800; 160 MG/1; MG/1
1 TABLET ORAL EVERY 12 HOURS SCHEDULED
Status: DISCONTINUED | OUTPATIENT
Start: 2022-07-15 | End: 2022-07-16 | Stop reason: HOSPADM

## 2022-07-15 RX ORDER — LISINOPRIL 20 MG/1
20 TABLET ORAL DAILY
Status: DISCONTINUED | OUTPATIENT
Start: 2022-07-15 | End: 2022-07-16 | Stop reason: HOSPADM

## 2022-07-15 RX ORDER — SODIUM CHLORIDE 0.9 % (FLUSH) 0.9 %
5-40 SYRINGE (ML) INJECTION EVERY 12 HOURS SCHEDULED
Status: DISCONTINUED | OUTPATIENT
Start: 2022-07-15 | End: 2022-07-15 | Stop reason: SDUPTHER

## 2022-07-15 RX ORDER — HYDROCODONE BITARTRATE AND ACETAMINOPHEN 5; 325 MG/1; MG/1
1 TABLET ORAL EVERY 6 HOURS PRN
Status: DISCONTINUED | OUTPATIENT
Start: 2022-07-15 | End: 2022-07-16 | Stop reason: HOSPADM

## 2022-07-15 RX ORDER — ACETAMINOPHEN 325 MG/1
650 TABLET ORAL EVERY 6 HOURS PRN
Status: DISCONTINUED | OUTPATIENT
Start: 2022-07-15 | End: 2022-07-16 | Stop reason: HOSPADM

## 2022-07-15 RX ORDER — SODIUM CHLORIDE 0.9 % (FLUSH) 0.9 %
5-40 SYRINGE (ML) INJECTION PRN
Status: DISCONTINUED | OUTPATIENT
Start: 2022-07-15 | End: 2022-07-15 | Stop reason: SDUPTHER

## 2022-07-15 RX ORDER — LIDOCAINE HYDROCHLORIDE 10 MG/ML
INJECTION, SOLUTION EPIDURAL; INFILTRATION; INTRACAUDAL; PERINEURAL PRN
Status: DISCONTINUED | OUTPATIENT
Start: 2022-07-15 | End: 2022-07-15 | Stop reason: ALTCHOICE

## 2022-07-15 RX ORDER — POLYETHYLENE GLYCOL 3350 17 G/17G
17 POWDER, FOR SOLUTION ORAL DAILY PRN
Status: DISCONTINUED | OUTPATIENT
Start: 2022-07-15 | End: 2022-07-16 | Stop reason: HOSPADM

## 2022-07-15 RX ORDER — SODIUM CHLORIDE 9 MG/ML
INJECTION, SOLUTION INTRAVENOUS PRN
Status: DISCONTINUED | OUTPATIENT
Start: 2022-07-15 | End: 2022-07-16 | Stop reason: HOSPADM

## 2022-07-15 RX ORDER — ONDANSETRON 2 MG/ML
4 INJECTION INTRAMUSCULAR; INTRAVENOUS EVERY 6 HOURS PRN
Status: DISCONTINUED | OUTPATIENT
Start: 2022-07-15 | End: 2022-07-16 | Stop reason: HOSPADM

## 2022-07-15 RX ORDER — SODIUM CHLORIDE 9 MG/ML
INJECTION, SOLUTION INTRAVENOUS PRN
Status: DISCONTINUED | OUTPATIENT
Start: 2022-07-15 | End: 2022-07-15 | Stop reason: SDUPTHER

## 2022-07-15 RX ORDER — SODIUM CHLORIDE 0.9 % (FLUSH) 0.9 %
5-40 SYRINGE (ML) INJECTION PRN
Status: DISCONTINUED | OUTPATIENT
Start: 2022-07-15 | End: 2022-07-16 | Stop reason: HOSPADM

## 2022-07-15 RX ORDER — ENOXAPARIN SODIUM 100 MG/ML
40 INJECTION SUBCUTANEOUS 2 TIMES DAILY
Status: DISCONTINUED | OUTPATIENT
Start: 2022-07-15 | End: 2022-07-16 | Stop reason: HOSPADM

## 2022-07-15 RX ORDER — ACETAMINOPHEN 650 MG/1
650 SUPPOSITORY RECTAL EVERY 6 HOURS PRN
Status: DISCONTINUED | OUTPATIENT
Start: 2022-07-15 | End: 2022-07-16 | Stop reason: HOSPADM

## 2022-07-15 RX ORDER — ACETAMINOPHEN 500 MG
500 TABLET ORAL EVERY 8 HOURS PRN
Status: DISCONTINUED | OUTPATIENT
Start: 2022-07-15 | End: 2022-07-15 | Stop reason: DRUGHIGH

## 2022-07-15 RX ORDER — SODIUM CHLORIDE 9 MG/ML
INJECTION, SOLUTION INTRAVENOUS CONTINUOUS PRN
Status: DISCONTINUED | OUTPATIENT
Start: 2022-07-15 | End: 2022-07-15 | Stop reason: SDUPTHER

## 2022-07-15 RX ADMIN — Medication 10 ML: at 21:31

## 2022-07-15 RX ADMIN — SULFAMETHOXAZOLE AND TRIMETHOPRIM 1 TABLET: 800; 160 TABLET ORAL at 21:31

## 2022-07-15 RX ADMIN — LISINOPRIL 20 MG: 20 TABLET ORAL at 08:19

## 2022-07-15 RX ADMIN — PROPOFOL 150 MCG/KG/MIN: 10 INJECTION, EMULSION INTRAVENOUS at 17:07

## 2022-07-15 RX ADMIN — PIPERACILLIN AND TAZOBACTAM 4500 MG: 4; .5 INJECTION, POWDER, FOR SOLUTION INTRAVENOUS at 00:27

## 2022-07-15 RX ADMIN — ENOXAPARIN SODIUM 40 MG: 100 INJECTION SUBCUTANEOUS at 21:31

## 2022-07-15 RX ADMIN — VANCOMYCIN HYDROCHLORIDE 1500 MG: 500 INJECTION, POWDER, LYOPHILIZED, FOR SOLUTION INTRAVENOUS at 00:57

## 2022-07-15 RX ADMIN — SODIUM CHLORIDE: 9 INJECTION, SOLUTION INTRAVENOUS at 17:00

## 2022-07-15 ASSESSMENT — PAIN DESCRIPTION - DESCRIPTORS: DESCRIPTORS: ACHING;DULL;DISCOMFORT

## 2022-07-15 ASSESSMENT — PAIN DESCRIPTION - ORIENTATION: ORIENTATION: RIGHT

## 2022-07-15 ASSESSMENT — PAIN - FUNCTIONAL ASSESSMENT: PAIN_FUNCTIONAL_ASSESSMENT: ACTIVITIES ARE NOT PREVENTED

## 2022-07-15 ASSESSMENT — PAIN SCALES - GENERAL
PAINLEVEL_OUTOF10: 0
PAINLEVEL_OUTOF10: 0
PAINLEVEL_OUTOF10: 2
PAINLEVEL_OUTOF10: 1

## 2022-07-15 ASSESSMENT — LIFESTYLE VARIABLES: SMOKING_STATUS: 0

## 2022-07-15 ASSESSMENT — PAIN DESCRIPTION - LOCATION: LOCATION: TOE (COMMENT WHICH ONE)

## 2022-07-15 NOTE — PATIENT CARE CONFERENCE
[facility] Quality Flow/Interdisciplinary Rounds Progress Note        Quality Flow Rounds held on July 15, 2022    Disciplines Attending:  Bedside Nurse, , , and Nursing Unit Sudhir Jama was admitted on 7/14/2022 11:59 PM    Anticipated Discharge Date:       Disposition:    Rafiq Score:  Rafiq Scale Score: 22    Readmission Risk              Risk of Unplanned Readmission:  [id number]           Discussed patient goal for the day, patient clinical progression, and barriers to discharge.   The following Goal(s) of the Day/Commitment(s) have been identified:  Diagnostics - Report Results and Labs - Report Results      Rupal Allen RN  July 15, 2022

## 2022-07-15 NOTE — ED PROVIDER NOTES
HPI:  7/15/22,   Time: 12:15 AM EDT       Chelo Contreras is a 61 y.o. male presenting to the ED for rt great toe infection/maggot infestation, beginning weeks ago. The complaint has been persistent, moderate in severity, and worsened by nothing. Right toe issues for weeks, seen at podiatry today. No fever. No n/v/d. Right toe red, podiatrist was suspected ingrown toe nail, but when elev nail, maggots expressed from area. Sent in for further tx    Review of Systems:   Pertinent positives and negatives are stated within HPI, all other systems reviewed and are negative.          --------------------------------------------- PAST HISTORY ---------------------------------------------  Past Medical History:  has a past medical history of Fracture, Hyperlipidemia, Hypertension, Obesity, and Pre-diabetes. Past Surgical History:  has a past surgical history that includes Nasal fracture surgery (1970); Ankle fracture surgery (Right, 10/2/2019); and Tonsillectomy. Social History:  reports that he has never smoked. He has never used smokeless tobacco. He reports that he does not currently use alcohol. He reports that he does not use drugs. Family History: family history is not on file. The patients home medications have been reviewed. Allergies: Patient has no known allergies. ---------------------------------------------------PHYSICAL EXAM--------------------------------------    Constitutional/General: Alert and oriented x3, well appearing, non toxic in NAD  Head: Normocephalic and atraumatic  Eyes: PERRL, EOMI, conjunctive normal, sclera non icteric  Mouth: Oropharynx clear, handling secretions, no trismus, no asymmetry of the posterior oropharynx or uvular edema  Neck: Supple, full ROM, s  Respiratory:  Not in respiratory distress  Cardiovascular:   2+ distal pulses  Chest: No chest wall tenderness  . Musculoskeletal: Moves all extremities x 4.  Warm and well perfused, no clubbing, cyanosis, or edema. Capillary refill <3 seconds  Integument: right great toe erythematous. Maggots noted b/t nail and bed   Lymphatic: no lymphadenopathy noted  Neurologic: GCS 15, no focal deficits,   Psychiatric: Normal Affect    -------------------------------------------------- RESULTS -------------------------------------------------  I have personally reviewed all laboratory and imaging results for this patient. Results are listed below.      LABS:  Results for orders placed or performed during the hospital encounter of 07/14/22   CBC with Auto Differential   Result Value Ref Range    WBC 6.8 4.5 - 11.5 E9/L    RBC 5.06 3.80 - 5.80 E12/L    Hemoglobin 16.7 (H) 12.5 - 16.5 g/dL    Hematocrit 46.9 37.0 - 54.0 %    MCV 92.7 80.0 - 99.9 fL    MCH 33.0 26.0 - 35.0 pg    MCHC 35.6 (H) 32.0 - 34.5 %    RDW 11.8 11.5 - 15.0 fL    Platelets 097 802 - 688 E9/L    MPV 9.3 7.0 - 12.0 fL    Neutrophils % 54.3 43.0 - 80.0 %    Immature Granulocytes % 0.4 0.0 - 5.0 %    Lymphocytes % 33.6 20.0 - 42.0 %    Monocytes % 9.2 2.0 - 12.0 %    Eosinophils % 1.9 0.0 - 6.0 %    Basophils % 0.6 0.0 - 2.0 %    Neutrophils Absolute 3.67 1.80 - 7.30 E9/L    Immature Granulocytes # 0.03 E9/L    Lymphocytes Absolute 2.27 1.50 - 4.00 E9/L    Monocytes Absolute 0.62 0.10 - 0.95 E9/L    Eosinophils Absolute 0.13 0.05 - 0.50 E9/L    Basophils Absolute 0.04 0.00 - 0.20 E9/L   Comprehensive Metabolic Panel w/ Reflex to MG   Result Value Ref Range    Sodium 134 132 - 146 mmol/L    Potassium reflex Magnesium 4.0 3.5 - 5.0 mmol/L    Chloride 98 98 - 107 mmol/L    CO2 20 (L) 22 - 29 mmol/L    Anion Gap 16 7 - 16 mmol/L    Glucose 100 (H) 74 - 99 mg/dL    BUN 22 (H) 6 - 20 mg/dL    CREATININE 1.0 0.7 - 1.2 mg/dL    GFR Non-African American >60 >=60 mL/min/1.73    GFR African American >60     Calcium 9.5 8.6 - 10.2 mg/dL    Total Protein 7.3 6.4 - 8.3 g/dL    Albumin 4.6 3.5 - 5.2 g/dL    Total Bilirubin 0.5 0.0 - 1.2 mg/dL    Alkaline Phosphatase 60 40 - 129 U/L    ALT 25 0 - 40 U/L    AST 20 0 - 39 U/L   Lactic Acid   Result Value Ref Range    Lactic Acid 2.0 0.5 - 2.2 mmol/L   Sedimentation Rate   Result Value Ref Range    Sed Rate 2 0 - 15 mm/Hr       RADIOLOGY:  Interpreted by Radiologist.  XR FOOT RIGHT (MIN 3 VIEWS)   Final Result   No acute findings. EKG:  This EKG is signed and interpreted by the EP. Time:   Rate:   Rhythm:   Interpretation:   Comparison:       ------------------------- NURSING NOTES AND VITALS REVIEWED ---------------------------   The nursing notes within the ED encounter and vital signs as below have been reviewed by myself. /86   Pulse 83   Temp 98.2 °F (36.8 °C) (Oral)   Resp 16   Ht 6' 1\" (1.854 m)   Wt (!) 338 lb (153.3 kg)   SpO2 96%   BMI 44.59 kg/m²   Oxygen Saturation Interpretation: Normal    The patients available past medical records and past encounters were reviewed. ------------------------------ ED COURSE/MEDICAL DECISION MAKING----------------------  Medications   lisinopril (PRINIVIL;ZESTRIL) tablet 20 mg (has no administration in time range)   piperacillin-tazobactam (ZOSYN) 4,500 mg in dextrose 5 % 100 mL IVPB (mini-bag) (0 mg IntraVENous Stopped 7/15/22 0057)   vancomycin 1500 mg in dextrose 5% 300 mL IVPB (0 mg IntraVENous Stopped 7/15/22 0318)         ED COURSE:       Medical Decision Making:    Irrigated toe, abx ordered, admit for further care      This patient's ED course included: a personal history and physicial examination    This patient has remained hemodynamically stable during their ED course. Re-Evaluations:             Re-evaluation.   Patients symptoms are improving    Re-examination  7/15/22   12:15 AM EDT          Vital Signs:   Vitals:    07/14/22 2115 07/15/22 0008   BP: 131/79 133/86   Pulse: 83 83   Resp: 18 16   Temp: 98.2 °F (36.8 °C)    TempSrc: Oral    SpO2: 99% 96%   Weight: (!) 338 lb (153.3 kg)    Height: 6' 1\" (1.854 m)        Consultations: hospitalist    Critical Care:         Counseling: The emergency provider has spoken with the patient and discussed todays results, in addition to providing specific details for the plan of care and counseling regarding the diagnosis and prognosis. Questions are answered at this time and they are agreeable with the plan.       --------------------------------- IMPRESSION AND DISPOSITION ---------------------------------    IMPRESSION  1. Maggot infestation    2. Cellulitis of right toe        DISPOSITION  Disposition: Admit to med/surg floor  Patient condition is stable    NOTE: This report was transcribed using voice recognition software.  Every effort was made to ensure accuracy; however, inadvertent computerized transcription errors may be present        Samina Rodgers MD  07/15/22 8802

## 2022-07-15 NOTE — H&P
[facility] History and Physical        Chief Complaint:  toe maggot infestation  History of Present Illness   The patient is a 61 y.o. male    Months of R great toe--worsening c/o painful toenail right great toenail. ONchomychosis + ingrown right great toenail at least over past year . Was treated several times with oral Lamisil for nail fungus.-which is temporarily helfpul. r    Seen in Podiatry office yesterday--per Dr Du Murdock:  \"Dermatology examination:    Mild edema medial lateral border right great toenail with incurvated nail. Bilateral great toenail thickened and mycotic. Right great toenail is intact. No drainage noted. Multiple maggots noted underlying right great toenail. Wound does not track to bone or probe to bone. Elin Steve Concern for underlying infection especially with maggot infestation noted. Did recommend going to the emergency room for likely antibiotics and radiographs. Hopefully the emergency room is able to flush out the maggots\"    Sent to ER:-   Xray does NOT show osteo  WBC 6.8, ESR 2- no fever, not septic   But given IV zosyn and vanco =-- (I will defer abx for now till seen by podiatry),   NO washout idone n ER- instead we were requested to admit.      - hx taken from the patient and records  REVIEW OF SYSTEMS:  no fevers, chills, cp, sob, n/v, ha, vision/hearing changes, wt changes, hot/cold flashes, other open skin lesions, diarrhea, constipation, dysuria/hematuria unless noted in HPI. Complete ROS performed with the patient and is otherwise negative.     Past Medical History:      Diagnosis Date    Fracture     right Malleolus    Hyperlipidemia     Hypertension     Obesity     Pre-diabetes        Past Surgical History:        Procedure Laterality Date    ANKLE FRACTURE SURGERY Right 10/2/2019    RIGHT MEDIAL MALLEOLUS OPEN REDUCTION INTERNAL FIXATION, OPEN REDUCTION INTERNAL FIXATION RIGHT SYNDESMOSIS performed by Stefano Harris MD at [address] [facility]  1970    TONSILLECTOMY      with adenoidectomy       Home Medications:  Prior to Admission medications    Medication Sig Start Date End Date Taking? Authorizing Provider   lisinopril (PRINIVIL;ZESTRIL) 20 MG tablet Take 1 tablet by mouth daily 7/12/22   Truett Flatness, DO       Allergies:  Patient has no known allergies. Social History:   TOBACCO:   reports that he has never smoked. He has never used smokeless tobacco.  ETOH:   reports previous alcohol use. Family History:   No family history on file. Or deferred/otherwise considered non contributory to current admission  PHYSICAL EXAM:    VS: /86   Pulse 83   Temp 98.2 °F (36.8 °C) (Oral)   Resp 16   Ht 6' 1\" (1.854 m)   Wt (!) 338 lb (153.3 kg)   SpO2 96%   BMI 44.59 kg/m²     General Appearance:     no acute distress. Psych:  HEENT:    A.O. As per HPI details  NC/AT, PERRL, no pallor no icterus, lips/ext mucous membrane grossly N    Neck:   Supple, trachea midline, no obvious JVD   Resp:     Dec bs No wheezes, No rhonchi   Chest wall:    No tenderness or deformity   Heart:    Regular rate and rhythm, S1 and S2 normal, no rub or gallop. Abdomen:     Soft, non-tender, bowel sounds active    Inc habitus   Genitalia & Rectal:    Deferred. Extremities x4:   Extremities atraumatic, no cyanosis, no clubbing   Musculoskeletal:      NO active synovitis or swollen b/l wrists, 2-5 MCPs examined   Skin:   +conchomychosis  Currently R foot soaking in epson bath  -- when examined, not very erythematous, no maggots seen by me    Per podiatry note earlier:  \"Mild edema medial lateral border right great toenail with incurvated nail. Bilateral great toenail thickened and mycotic. Right great toenail is intact. No drainage noted. Multiple maggots noted underlying right great toenail.   Wound does not track to bone or probe to bone\"   Lymph nodes:   Cervical nodes grossly normal   Neurologic:  .Grossly symmetric  strength in UEs and LEs with symmetric grossly intact to light touch sensation     LABS:  CBC:   Lab Results   Component Value Date/Time    WBC 6.8 07/14/2022 10:55 PM    RBC 5.06 07/14/2022 10:55 PM    HGB 16.7 07/14/2022 10:55 PM    HCT 46.9 07/14/2022 10:55 PM     07/14/2022 10:55 PM    MCV 92.7 07/14/2022 10:55 PM     BMP:    Lab Results   Component Value Date/Time     07/14/2022 10:55 PM    K 4.0 07/14/2022 10:55 PM    CL 98 07/14/2022 10:55 PM    CO2 20 07/14/2022 10:55 PM    BUN 22 07/14/2022 10:55 PM    CREATININE 1.0 07/14/2022 10:55 PM    GLUCOSE 100 07/14/2022 10:55 PM    CALCIUM 9.5 07/14/2022 10:55 PM     Hepatic Function Panel:    Lab Results   Component Value Date/Time    ALKPHOS 60 07/14/2022 10:55 PM    AST 20 07/14/2022 10:55 PM    ALT 25 07/14/2022 10:55 PM    PROT 7.3 07/14/2022 10:55 PM    LABALBU 4.6 07/14/2022 10:55 PM    BILITOT 0.5 07/14/2022 10:55 PM     Magnesium:  No results found for: MG    PT/INR:  No results found for: PROTIME, INR  U/A:   Lab Results   Component Value Date/Time    LEUKOCYTESUR TRACE 09/27/2019 11:43 PM    PHUR 5.5 09/27/2019 11:43 PM    WBCUA 0-1 09/27/2019 11:43 PM    RBCUA NONE 09/27/2019 11:43 PM    BACTERIA NONE 09/27/2019 11:43 PM    SPECGRAV 1.025 09/27/2019 11:43 PM    BLOODU Negative 09/27/2019 11:43 PM    GLUCOSEU Negative 09/27/2019 11:43 PM     ABG:  No results found for: PHART, FPW9QOK, PO2ART, H3PUFYZG, FAJ3JJL, BEART  TSH:    Lab Results   Component Value Date/Time    TSH 1.610 07/14/2022 01:07 PM     Cardiac Enzymes:   Lab Results   Component Value Date    TROPONINI <0.01 09/27/2019       Radiology: XR FOOT RIGHT (MIN 3 VIEWS)    Result Date: 7/14/2022  EXAMINATION: THREE XRAY VIEWS OF THE RIGHT FOOT 7/14/2022 10:29 pm COMPARISON: None. HISTORY: ORDERING SYSTEM PROVIDED HISTORY: foot infection TECHNOLOGIST PROVIDED HISTORY: Reason for exam:->foot infection FINDINGS: Evaluation is limited due to technique/positioning.   No acute no acute fracture or dislocation is identified. There is evidence of previous ORIF of the medial and lateral malleolus. No surrounding lucency or hardware migration. The talar dome is intact     No acute findings. Assessment & Plan   ACTIVE hospital problems being addressed/reassessed for this admission:  Principal Problem:    Toe infection  Active Problems:    BMI 45.0-49.9, adult (HCC)    Maggot infestation    Essential hypertension  Resolved Problems:    * No resolved hospital problems. *    Code status/DVT prophylaxis and PLAN --see orders   Note extensive time spent coordinating care between ER docs, ER and floor nurses, and transitioning care over to day providers  Plan of care/ clinical impressions/communication specifics detailed below:    toe maggot infestation- noted occult    Months of R great toe--worsening c/o painful toenail right great toenail. ONchomychosis + ingrown right great toenail at least over past year . Was treated several times with oral Lamisil for nail fungus.-which is temporarily helfpul. r    Seen in Podiatry office yesterday--per Dr Rock Nones:  \"Dermatology examination:    Mild edema medial lateral border right great toenail with incurvated nail. Bilateral great toenail thickened and mycotic. Right great toenail is intact. No drainage noted. Multiple maggots noted underlying right great toenail. Wound does not track to bone or probe to bone. Víctor Méndez Concern for underlying infection especially with maggot infestation noted. Did recommend going to the emergency room for likely antibiotics and radiographs. Hopefully the emergency room is able to flush out the maggots\"    Sent to ER:-   Xray does NOT show osteo  WBC 6.8, ESR 2- no fever, not septic   But given IV zosyn and vanco =-- (I will defer abx for now till seen by podiatry/ID consult),   NO washout idone n ER- instead we were requested to admit.     Will keep NPO- ?[address], no hx of PETRA  HTN on lisinopril      Linda Ugarte MD   Night Officer, overnight admitting doctor at [facility] call day time doctor   for questions after 7:30am    Covering for [facility]  If Qs please call [phone]  Electronically signed by Betsy Eisenmenger, MD on 7/15/2022 at 12:25 AM

## 2022-07-15 NOTE — PROGRESS NOTES
[facility] Progress Note    Admitting Date and Time: 7/14/2022 11:59 PM  Admit Dx: Maggot infestation [B87.9]  Cellulitis of right toe [H71.476]    Subjective:  Patient is being followed for Maggot infestation [B87.9]  Cellulitis of right toe [A56.038]     Seen at bedside, sitting up in chair  C/o pain to right great toe, there is surrounding erythema and edema   Maggots seen   No acute events overnight     ROS: denies fever, chills, cp, sob, n/v, HA unless stated above.      lisinopril  20 mg Oral Daily     HYDROcodone 5 mg - acetaminophen, 1 tablet, Q6H PRN  acetaminophen, 500 mg, Q8H PRN       Objective:    BP (!) 117/57   Pulse 94   Temp 98.2 °F (36.8 °C) (Oral)   Resp 18   Ht 6' 1\" (1.854 m)   Wt (!) 338 lb (153.3 kg)   SpO2 95%   BMI 44.59 kg/m²     General Appearance: alert and oriented to person, place and time and in no acute distress  Skin: warm and dry + right great toe erythema/edema/ingrown toenail with maggots expressed   Head: normocephalic and atraumatic  Eyes: pupils equal, round, and reactive to light, extraocular eye movements intact, conjunctivae normal  Neck: neck supple and non tender without mass   Pulmonary/Chest: clear to auscultation bilaterally- no wheezes, rales or rhonchi, normal air movement, no respiratory distress  Cardiovascular: normal rate, normal S1 and S2 and no carotid bruits  Abdomen: soft, non-tender, non-distended, normal bowel sounds, no masses or organomegaly  Extremities: no cyanosis, no clubbing and no edema  Neurologic: no cranial nerve deficit and speech normal    Recent Labs     07/14/22  1307 07/14/22  2255    134   K 4.1 4.0    98   CO2 18* 20*   BUN 25* 22*   CREATININE 1.1 1.0   GLUCOSE 88 100*   CALCIUM 9.5 9.5       Recent Labs     07/14/22  1307 07/14/22  2255   WBC 6.8 6.8   RBC 5.02 5.06   HGB 16.2 16.7*   HCT 48.0 46.9   MCV 95.6 92.7   MCH 32.3 33.0   MCHC 33.8 35.6*   RDW 12.2 11.8    235   MPV 9.9 9.3 Radiology: reviewed     Assessment:  Principal Problem:    Toe infection  Active Problems:    BMI 45.0-49.9, adult (HCC)    Maggot infestation    Essential hypertension  Resolved Problems:    * No resolved hospital problems. *    Plan:  1. Onchomycosis, ingrown toenail, maggot infestation -  Has been treated outpatient with Oral Lamisil for nail fungus which was only temporarily helpful because then the nail became painful and incurvated. Patient was lost to follow up with podiatry until 7/14/22 when he saw Dr Nigel Stone. Maggots were noted underneath right great toenail, wound does not probe to bone. Xray negative for osteomyelitis. WBC 6.8, ESR 2 without fever. Not septic. Zosyn and Vancomycin in ER. OR tonight for right great toe avulsion with I&D. Continue NPO until after surgery then start on regular diet. 2. ? HLD - states that cholesterol is being monitored routinely by PCP for which he had fasting blood work only 2 days ago and will be starting on medication per Dr Bandar Roman     3. HTN, continue on Lisinopril - monitor     CODE: full  DVT prophylaxis: lovenox   Discharge dispo: home     30 minutes time spent reviewing patient chart, assessing patient, discussing plan of care with patient and family, discussing plan of care with collaborating physician, and charting.      Electronically signed by ERNESTO Ernst NP on 7/15/2022 at 2:20 PM

## 2022-07-15 NOTE — ANESTHESIA PRE PROCEDURE
Department of Anesthesiology  Preprocedure Note       Name:  Esvin Santos   Age:  61 y.o.  :  1965                                          MRN:  [id number]         Date:  7/15/2022      Surgeon: Mariah Rivera):  John Berry DPM    Procedure: RIGHT GREAT TOENAIL AVULSION WITH I & D    ++AVAILABLE 5PM++ (Right)    Medications prior to admission:   Prior to Admission medications    Medication Sig Start Date End Date Taking? Authorizing Provider   lisinopril (PRINIVIL;ZESTRIL) 20 MG tablet Take 1 tablet by mouth daily 22   [facility], DO       Current medications:    No current facility-administered medications for this visit. No current outpatient medications on file.      Facility-Administered Medications Ordered in Other Visits   Medication Dose Route Frequency Provider Last Rate Last Admin    lisinopril (PRINIVIL;ZESTRIL) tablet 20 mg  20 mg Oral Daily Skyler Sosa MD   20 mg at 07/15/22 0819    HYDROcodone-acetaminophen (NORCO) 5-325 MG per tablet 1 tablet  1 tablet Oral Q6H PRN Skyler Sosa MD        acetaminophen (TYLENOL) tablet 500 mg  500 mg Oral Q8H PRN Skyler Sosa MD           Allergies:  No Known Allergies    Problem List:    Patient Active Problem List   Diagnosis Code    Displaced fracture of medial malleolus of right tibia S82.51XA    Sprain of tibiofibular ligament of right ankle S93.431A    Closed nondisplaced oblique fracture of shaft of right fibula S82.434A    Essential hypertension I10    Hyperlipidemia E78.5    Toe infection L08.9    BMI 45.0-49.9, adult ([facility] Z68.42    Maggot infestation B87.9       Past Medical History:        Diagnosis Date    Fracture     right Malleolus    Hyperlipidemia     Hypertension     Obesity     Pre-diabetes        Past Surgical History:        Procedure Laterality Date    ANKLE FRACTURE SURGERY Right 10/2/2019    RIGHT MEDIAL MALLEOLUS OPEN REDUCTION INTERNAL FIXATION, OPEN REDUCTION INTERNAL FIXATION RIGHT SYNDESMOSIS performed by Aristeo Ortiz MD at [address] [place] [address]      with adenoidectomy       Social History:    Social History     Tobacco Use    Smoking status: Never    Smokeless tobacco: Never   Substance Use Topics    Alcohol use: Not Currently                                Counseling given: Not Answered      Vital Signs (Current): There were no vitals filed for this visit. BP Readings from Last 3 Encounters:   07/15/22 (!) 117/57   07/12/22 126/88   01/03/22 136/84       NPO Status:  8+hrs                                                                               BMI:   Wt Readings from Last 3 Encounters:   07/14/22 (!) 338 lb (153.3 kg)   07/14/22 (!) 338 lb (153.3 kg)   07/12/22 (!) 338 lb 3.2 oz (153.4 kg)     There is no height or weight on file to calculate BMI.    CBC:   Lab Results   Component Value Date/Time    WBC 6.8 07/14/2022 10:55 PM    RBC 5.06 07/14/2022 10:55 PM    HGB 16.7 07/14/2022 10:55 PM    HCT 46.9 07/14/2022 10:55 PM    MCV 92.7 07/14/2022 10:55 PM    RDW 11.8 07/14/2022 10:55 PM     07/14/2022 10:55 PM       CMP:   Lab Results   Component Value Date/Time     07/14/2022 10:55 PM    K 4.0 07/14/2022 10:55 PM    CL 98 07/14/2022 10:55 PM    CO2 20 07/14/2022 10:55 PM    BUN 22 07/14/2022 10:55 PM    CREATININE 1.0 07/14/2022 10:55 PM    GFRAA >60 07/14/2022 10:55 PM    LABGLOM >60 07/14/2022 10:55 PM    GLUCOSE 100 07/14/2022 10:55 PM    PROT 7.3 07/14/2022 10:55 PM    CALCIUM 9.5 07/14/2022 10:55 PM    BILITOT 0.5 07/14/2022 10:55 PM    ALKPHOS 60 07/14/2022 10:55 PM    AST 20 07/14/2022 10:55 PM    ALT 25 07/14/2022 10:55 PM       POC Tests: No results for input(s): POCGLU, POCNA, POCK, POCCL, POCBUN, POCHEMO, POCHCT in the last 72 hours.     Coags: No results found for: PROTIME, INR, APTT    HCG (If Applicable): No results found for: PREGTESTUR, PREGSERUM, HCG, HCGQUANT     ABGs: No results found for: PHART, PO2ART, HES0MZK, XRJ1EZU, BEART, Z8GHGHEE     Type & Screen (If Applicable):  No results found for: [facility]    Anesthesia Evaluation  Patient summary reviewed and Nursing notes reviewed no history of anesthetic complications:   Airway: Mallampati: III  TM distance: <3 FB   Neck ROM: full  Mouth opening: > = 3 FB   Dental:      Comment: Missing teeth    Pulmonary:Negative Pulmonary ROS breath sounds clear to auscultation      (-) not a current smoker                           Cardiovascular:  Exercise tolerance: good (>4 METS),   (+) hypertension: mild, hyperlipidemia    (-)  TO    ECG reviewed  Rhythm: regular  Rate: normal           Beta Blocker:  Not on Beta Blocker         Neuro/Psych:   Negative Neuro/Psych ROS              GI/Hepatic/Renal:   (+) morbid obesity          Endo/Other: Negative Endo/Other ROS                    Abdominal:             Vascular: negative vascular ROS. Other Findings:             Anesthesia Plan      MAC     ASA 3       Induction: intravenous. Anesthetic plan and risks discussed with patient. Plan discussed with attending. Merritt Osman RN   7/15/2022      Patient seen and chart reviewed. No interval change in history or exam.   Anesthesia plan discussed, risk/benefits addressed. Patient's concerns and questions answered.      Rupal Giordano, ERNESTO - CRNA  July 15, 2022  4:24 PM

## 2022-07-15 NOTE — BRIEF OP NOTE
Brief Postoperative Note      Patient: Rose Marie Macias  YOB: 1965  MRN: [id number]    Date of Procedure: 7/15/2022    Pre-Op Diagnosis: Toe infection [L08.9]    Post-Op Diagnosis: Same       Procedure(s):  RIGHT GREAT TOENAIL AVULSION    Surgeon(s):  Taylor Rucker DPM    Assistant:  Resident: Hector Kemp DPM    Anesthesia: Monitor Anesthesia Care, 7 cc of 1% lidocaine plain preoperatively hallux block    Estimated Blood Loss (mL): Minimal    Complications: None    Specimens:   ID Type Source Tests Collected by Time Destination   A : RIGHT FOOT GRAND TOENAIL  Tissue Tissue SURGICAL PATHOLOGY Taylor Rucker DPM 7/15/2022 1718        Implants:  * No implants in log *      Drains: * No LDAs found *    Findings: Maggots noted nail at the hyponychium. All necrotic tissue debrided sharply, healthy granular wound bed to right hallux nail bed. Nail borders removed from medial and lateral paronychia of the hallux. No purulence or drainage intraoperatively, minimal necrotic soft tissue to wound bed.      Electronically signed by Hector Kemp DPM on 7/15/2022 at 5:29 PM

## 2022-07-15 NOTE — PROGRESS NOTES
Patient came back from surgery with a bleeding IV site. Tried to insert new site but the patient is a very hard stick and I placed a call to the clinical manager to see if maybe someone could come and  insert one for me.   Electronically signed by Nivia Rodriguez RN on 7/15/2022 at 7:41 PM

## 2022-07-15 NOTE — CARE COORDINATION
Social Work discharge planning    Sw consult \"home needs\" noted. Pt said he lives with wife Romi Dailey in 1 story with 2 step entry. ZPt drives He has cane and ww from previous leg fracture. His PCP is Dr Steffany Evans. He uses [facility] for his pharmacy. Pt said he does NOT anticipate hhc need, nor dme need. Pt said he wants to go home tonight post op if he is able. Social Work to follow for support and discharge planning with CM.   Electronically signed by Leila Acosta on 7/15/2022 at 2:01 PM

## 2022-07-15 NOTE — ED NOTES
Downtime [id number] see paper chart, sbar faxed to ineduardo unit  @ [address] [place] [address]  07/15/22 9168

## 2022-07-15 NOTE — PLAN OF CARE
Problem: Pain  Goal: Verbalizes/displays adequate comfort level or baseline comfort level  7/15/2022 1102 by Sarthak Roy RN  Outcome: Progressing  7/15/2022 0507 by Gerardo Fajardo RN  Outcome: Progressing     Problem: ABCDS Injury Assessment  Goal: Absence of physical injury  Outcome: Progressing     Problem: Skin/Tissue Integrity  Goal: Absence of new skin breakdown  Description: 1. Monitor for areas of redness and/or skin breakdown  2. Assess vascular access sites hourly  3. Every 4-6 hours minimum:  Change oxygen saturation probe site  4. Every 4-6 hours:  If on nasal continuous positive airway pressure, respiratory therapy assess nares and determine need for appliance change or resting period.   Outcome: Progressing

## 2022-07-15 NOTE — OP NOTE
Operative Note      Patient: John Hurst  YOB: 1965  MRN: [id number]    Date of Procedure: 7/15/2022    Pre-Op Diagnosis: Toe infection [L08.9]    Post-Op Diagnosis: Same       Procedure(s):  RIGHT GREAT TOENAIL AVULSION    Surgeon(s):  Gabriella Castro DPM    Assistant:   Resident: Olaf Toledo DPM    Anesthesia: Monitor Anesthesia Care, 7 cc of 1% lidocaine plain preoperatively hallux block    Estimated Blood Loss (mL): Minimal    Complications: None    Specimens:   ID Type Source Tests Collected by Time Destination   A : RIGHT FOOT GRAND TOENAIL  Tissue Tissue SURGICAL PATHOLOGY Gabriella Castro DPM 7/15/2022 1718        Implants:  * No implants in log *      Drains: * No LDAs found *    Findings: Maggots noted nail at the hyponychium. All necrotic tissue debrided sharply, healthy granular wound bed to right hallux nail bed. Nail borders removed from medial and lateral paronychia of the hallux. No purulence or drainage intraoperatively, minimal necrotic soft tissue to wound bed. Indications for procedure: Glory Groves was seen in my office yesterday and was sent directly to the emergency room for OR cleaning of the right great toenail with total nail avulsion and cleaning of the maggots. Patient been complaining of pain to the toe for several months and has been attempting to treat it with topical terbinafine. In the office source control was attempted however there were several maggots under the hyponychium and patient has been complaining of significant pain that was recalcitrant to local injection. It was discussed that the patient will be admitted for IV antibiotics and surgical debridement of the right hallux nail and nailbed. All risks and benefits of procedure explained in detail prior to the case including but not limited to recurrence of infection, excessive bleeding, excessive pain.   Patient acknowledged and understood all risks involved the procedure and agreed to proceed forward at this time. No guarantees were made regarding outcome procedure. Detailed Description of Procedure: On July 15, 212 this 48-year-old male was transported to the hospital preoperative holding area to the operating room and kept in his hospital bed in supine position. A timeout was performed and all in attendance were agreement of correct limb and procedure. Following induction of IV sedation a local anesthetic block was administered to the right hallux in a Hallux block fashion consisting of 7 cc of 1% Marcaine plain preoperatively. No tourniquet was applied. The right foot was then prepped and draped in usual sterile fashion for procedures performed:    Right hallux nail avulsion with debridement of necrotic tissue:  Attention was then directed towards the right hallux where preoperatively there were maggots noted to the hyponychium and erythema and warmth to the right hallux circumferentially extending to the sulcus of the right foot. Using a freer periosteal elevator the hyponychium was freed to the right hallux. Using a straight hemostat the nail was then avulsed in total.    I did send the toenail as well as several maggots which were on the toenail for permanent pathology. Using a curette the paronychia was then debrided of all nonviable and necrotic tissue and all remnants and spicules of the nail. The nailbed was then debrided similarly with sharp debridement revealing healthy granular wound bed. The site was then flushed copious amounts of sterile saline solution with no remaining nonviable tissue or maggots. Healthy bleeding was noted throughout the entire case. There was no pockets of purulence, no exposed bone, debridement was only carried down to the level of subcutaneous tissue no wound bed. Upon satisfactory completion the procedure the dressing consisting of Betadine, Xeroform, 4 x 4 gauze, Coban was applied to the right hallux and a mild compressive fashion.   The patient was awoken from anesthesia and transported from the OR to the postanesthesia care unit all vital signs stable no apparent distress. All neurovascular status was noted to be intact bilateral lower extremities. Following a brief period of postoperative monitoring the patient returned back to his hospital room with instructions to elevate the limb at rest.  He is to weight-bear as tolerated to right lower extremity. He is to leave all dressings clean dry and intact. Podiatry will manage dressings daily as indicated. Okay to be discharged likely tomorrow. Will need to use Neosporin and Band-Aid once daily right great upon discharge. We will follow-up in office within the week.

## 2022-07-15 NOTE — ANESTHESIA POSTPROCEDURE EVALUATION
Department of Anesthesiology  Postprocedure Note    Patient: Chelo Contreras  MRN: [id number]  YOB: 1965  Date of evaluation: 7/15/2022      Procedure Summary     Date: 07/15/22 Room / Location: [facility] OR 04 / [facility]    Anesthesia Start: 1700 Anesthesia Stop: 1729    Procedure: RIGHT GREAT TOENAIL AVULSION (Right: Toes) Diagnosis:       Toe infection      (Toe infection [L08.9])    Surgeons: Tracy Ibrahim DPM Responsible Provider: Padmini Bradley DO    Anesthesia Type: MAC ASA Status: 3          Anesthesia Type: No value filed.     Jett Phase I: Jett Score: 10    Jett Phase II:        Anesthesia Post Evaluation    Patient location during evaluation: PACU  Patient participation: complete - patient participated  Level of consciousness: awake and alert  Airway patency: patent  Nausea & Vomiting: no nausea and no vomiting  Complications: no  Cardiovascular status: hemodynamically stable  Respiratory status: acceptable

## 2022-07-15 NOTE — CONSULTS
[address] [place] [address] [facility]    Consultation Note     Admit Date: 7/14/2022 11:59 PM    Reason for Consult: Toe infection    Attending Physician:  Merle Welch MD     Chief Complaint:     HISTORY OF PRESENT ILLNESS:   The patient is a 61 y.o.  male not known to the Infectious Diseases service. The patient has HTN. HLD had redness over right toe for few months and noticed maggots underneath the nail yesterday so he presented to the ER. Has pain at the site. Has on and off purulent drainage. No fever or cough or SOB or abdominal pain or diarrhea. He is a non smoker and non diabetic. Since admission. He is afebrile HS stable. Labs showed normal CBC, LFTs. normal BUN/Cr. Blood cxin process. X-ray foot showed no acute findings. Pt got one dose of IV vancomycin/zosyn  and I got consulted for further recommendations. Past Medical History:        Diagnosis Date    Fracture     right Malleolus    Hyperlipidemia     Hypertension     Obesity     Pre-diabetes      Past Surgical History:        Procedure Laterality Date    ANKLE FRACTURE SURGERY Right 10/2/2019    RIGHT MEDIAL MALLEOLUS OPEN REDUCTION INTERNAL FIXATION, OPEN REDUCTION INTERNAL FIXATION RIGHT SYNDESMOSIS performed by Shameka Guan MD at [address]      with adenoidectomy     Current Medications:   Scheduled Meds:   lisinopril  20 mg Oral Daily    sulfamethoxazole-trimethoprim  1 tablet Oral 2 times per day     Continuous Infusions:  PRN Meds:HYDROcodone 5 mg - acetaminophen, acetaminophen    Allergies:  Patient has no known allergies. Social History:   Social History     Socioeconomic History    Marital status:    Tobacco Use    Smoking status: Never    Smokeless tobacco: Never   Substance and Sexual Activity    Alcohol use: Not Currently    Drug use: Never   Family History:   No family history on file. . Otherwise non-pertinent to the chief complaint.     REVIEW OF SYSTEMS: As mentioned in HPI, all other systems negative. PHYSICAL EXAM:    Vitals:    BP (!) 117/57   Pulse 94   Temp 98.2 °F (36.8 °C) (Oral)   Resp 18   Ht 6' 1\" (1.854 m)   Wt (!) 338 lb (153.3 kg)   SpO2 95%   BMI 44.59 kg/m²   Constitutional: The patient is awake, alert, and oriented. Skin: Warm and dry. No rashes were noted. No jaundice. HEENT: Eyes show round, and reactive pupils. Moist mucous membranes, no ulcerations, no thrush. Neck: Supple to movements. No lymphadenopathy. Chest: clear to auscultation  Cardiovascular: S1 and S2 are rhythmic and regular. No murmurs appreciated. Abdomen: soft, nontender  Extremities: No clubbing, no cyanosis, no edema. Musculoskeletal: maggots under neath the right great toe nail: redness over the toe.    Neurological: alert, oriented x 3  Lines: peripheral      CBC+dif:  Recent Labs     07/14/22  1307 07/14/22  2255   WBC 6.8 6.8   HGB 16.2 16.7*   HCT 48.0 46.9   MCV 95.6 92.7    235   NEUTROABS 4.14 3.67     Lab Results   Component Value Date    CRP 0.3 07/14/2022     No results found for: CRPHS  Lab Results   Component Value Date    SEDRATE 2 07/14/2022     Lab Results   Component Value Date    ALT 25 07/14/2022    AST 20 07/14/2022    ALKPHOS 60 07/14/2022    BILITOT 0.5 07/14/2022     Lab Results   Component Value Date/Time     07/14/2022 10:55 PM    K 4.0 07/14/2022 10:55 PM    CL 98 07/14/2022 10:55 PM    CO2 20 07/14/2022 10:55 PM    BUN 22 07/14/2022 10:55 PM    CREATININE 1.0 07/14/2022 10:55 PM    GFRAA >60 07/14/2022 10:55 PM    LABGLOM >60 07/14/2022 10:55 PM    GLUCOSE 100 07/14/2022 10:55 PM    PROT 7.3 07/14/2022 10:55 PM    LABALBU 4.6 07/14/2022 10:55 PM    CALCIUM 9.5 07/14/2022 10:55 PM    BILITOT 0.5 07/14/2022 10:55 PM    ALKPHOS 60 07/14/2022 10:55 PM    AST 20 07/14/2022 10:55 PM    ALT 25 07/14/2022 10:55 PM       No results found for: PROTIME, INR    Lab Results   Component Value Date/Time    TSH 1.610 07/14/2022 01:07 PM       Lab Results   Component Value Date/Time    COLORU Yellow 09/27/2019 11:43 PM    PHUR 5.5 09/27/2019 11:43 PM    WBCUA 0-1 09/27/2019 11:43 PM    RBCUA NONE 09/27/2019 11:43 PM    BACTERIA NONE 09/27/2019 11:43 PM    CLARITYU Clear 09/27/2019 11:43 PM    SPECGRAV 1.025 09/27/2019 11:43 PM    LEUKOCYTESUR TRACE 09/27/2019 11:43 PM    UROBILINOGEN 0.2 09/27/2019 11:43 PM    BILIRUBINUR Negative 09/27/2019 11:43 PM    BLOODU Negative 09/27/2019 11:43 PM    GLUCOSEU Negative 09/27/2019 11:43 PM       No results found for: HJJ6EJU, BEART, A5REBFXR, PHART, THGBART, COY9HJI, PO2ART, PUQ5EJG  Radiology:  XR FOOT RIGHT (MIN 3 VIEWS)   Final Result   No acute findings. VL LOWER EXTREMITY ARTERIAL SEGMENTAL PRESSURES W PPG BILATERAL    (Results Pending)   US DUP LOWER EXTREMITIES BILATERAL VENOUS    (Results Pending)       Microbiology:  Blood cx 7/14: in process    Assessment:  Right great toe ingrown nail with cellulitis/maggots infestation:   Reviewed podiatry notes: planning on OR tonight. He would like to get discharged after OR, if not tomorrow. Plan:    Started on Bactrim PO 1DS BID. Will follow OR notes and cx. If he gets discharged over the weekend, ok to d/c on Bactrim for 7 days. Will follow with you    Thank you for having us see this patient in consultation. I will be discussing this case with the treating physicians.       Electronically signed by Livingston Callow, MD on 7/15/2022 at 2:43 PM

## 2022-07-15 NOTE — CONSULTS
AAOx3  RLE focused:    Vascular Exam: Pedal pulses palpable. Lower extremity warm to cool from proximal tibial tuberosity to distal digits dorsally. No pedal hair growth observed. Capillary refill brisk to all digits. Erythema and edema noted to great toe. Neuro Exam:  Light touch sensation is intact    Dermatologic Exam: Right great toenail is incurvated both medially and laterally. Erythema and edema noted to global great toe. No evidence of purulence or malodor. Maggots noted underneath distal aspect of nail. MSK: Mild tenderness to palpation of medial border great toe. No pain with palpation of lateral border great toe. Current Facility-Administered Medications   Medication Dose Route Frequency Provider Last Rate Last Admin    lisinopril (PRINIVIL;ZESTRIL) tablet 20 mg  20 mg Oral Daily Kinsey Fofana MD   20 mg at 07/15/22 0819    HYDROcodone-acetaminophen (NORCO) 5-325 MG per tablet 1 tablet  1 tablet Oral Q6H PRN Kinsey Fofana MD        acetaminophen (TYLENOL) tablet 500 mg  500 mg Oral Q8H PRN Kinsey Fofana MD            Lab Results   Component Value Date    WBC 6.8 07/14/2022    HCT 46.9 07/14/2022    HGB 16.7 (H) 07/14/2022     07/14/2022     07/14/2022    K 4.0 07/14/2022    CL 98 07/14/2022    CO2 20 (L) 07/14/2022    BUN 22 (H) 07/14/2022    CREATININE 1.0 07/14/2022    GLUCOSE 100 (H) 07/14/2022    CRP 0.3 07/14/2022         Radiographs:    ASSESEMENT:  -Ingrown toenail, right hallux  -Cellulitis, right hallux  -Edema, right hallux  -Maggot infestation, right hallux  -Obesity     PLAN:  - Examined and evaluated  - All labs, imaging, and charts reviewed  -ID consulted. Appreciate input  -Right foot xrays:No acute findings  -NIVS ordered. Results pending  -OR planned for tonight: right great toenail avulsion with I&D. NPO immediately. Discussed procedure in detail with patient including risks and benefits. Patient expresses understanding and wishes to proceed  -Treatment consent and NPO ordered    Agree with resident plan.   Plan OR for total nail avulsion right great toenail due to maggot infestation right great toenail

## 2022-07-16 VITALS
HEIGHT: 73 IN | TEMPERATURE: 98.7 F | SYSTOLIC BLOOD PRESSURE: 131 MMHG | HEART RATE: 78 BPM | OXYGEN SATURATION: 97 % | RESPIRATION RATE: 17 BRPM | BODY MASS INDEX: 41.75 KG/M2 | DIASTOLIC BLOOD PRESSURE: 83 MMHG | WEIGHT: 315 LBS

## 2022-07-16 LAB
ALBUMIN SERPL-MCNC: 4.3 G/DL (ref 3.5–5.2)
ALP BLD-CCNC: 51 U/L (ref 40–129)
ALT SERPL-CCNC: 21 U/L (ref 0–40)
ANION GAP SERPL CALCULATED.3IONS-SCNC: 14 MMOL/L (ref 7–16)
AST SERPL-CCNC: 20 U/L (ref 0–39)
BASOPHILS ABSOLUTE: 0.03 E9/L (ref 0–0.2)
BASOPHILS RELATIVE PERCENT: 0.4 % (ref 0–2)
BILIRUB SERPL-MCNC: 0.9 MG/DL (ref 0–1.2)
BUN BLDV-MCNC: 21 MG/DL (ref 6–20)
CALCIUM SERPL-MCNC: 9.2 MG/DL (ref 8.6–10.2)
CHLORIDE BLD-SCNC: 102 MMOL/L (ref 98–107)
CO2: 20 MMOL/L (ref 22–29)
CREAT SERPL-MCNC: 1 MG/DL (ref 0.7–1.2)
EOSINOPHILS ABSOLUTE: 0.14 E9/L (ref 0.05–0.5)
EOSINOPHILS RELATIVE PERCENT: 2 % (ref 0–6)
GFR AFRICAN AMERICAN: >60
GFR NON-AFRICAN AMERICAN: >60 ML/MIN/1.73
GLUCOSE BLD-MCNC: 104 MG/DL (ref 74–99)
HCT VFR BLD CALC: 44.5 % (ref 37–54)
HEMOGLOBIN: 15.7 G/DL (ref 12.5–16.5)
IMMATURE GRANULOCYTES #: 0.04 E9/L
IMMATURE GRANULOCYTES %: 0.6 % (ref 0–5)
LYMPHOCYTES ABSOLUTE: 1.6 E9/L (ref 1.5–4)
LYMPHOCYTES RELATIVE PERCENT: 22.7 % (ref 20–42)
MCH RBC QN AUTO: 32.2 PG (ref 26–35)
MCHC RBC AUTO-ENTMCNC: 35.3 % (ref 32–34.5)
MCV RBC AUTO: 91.2 FL (ref 80–99.9)
MONOCYTES ABSOLUTE: 0.7 E9/L (ref 0.1–0.95)
MONOCYTES RELATIVE PERCENT: 9.9 % (ref 2–12)
NEUTROPHILS ABSOLUTE: 4.55 E9/L (ref 1.8–7.3)
NEUTROPHILS RELATIVE PERCENT: 64.4 % (ref 43–80)
PDW BLD-RTO: 11.9 FL (ref 11.5–15)
PLATELET # BLD: 208 E9/L (ref 130–450)
PMV BLD AUTO: 9.6 FL (ref 7–12)
POTASSIUM REFLEX MAGNESIUM: 4.5 MMOL/L (ref 3.5–5)
RBC # BLD: 4.88 E12/L (ref 3.8–5.8)
SODIUM BLD-SCNC: 136 MMOL/L (ref 132–146)
TOTAL PROTEIN: 6.6 G/DL (ref 6.4–8.3)
WBC # BLD: 7.1 E9/L (ref 4.5–11.5)

## 2022-07-16 PROCEDURE — 85025 COMPLETE CBC W/AUTO DIFF WBC: CPT

## 2022-07-16 PROCEDURE — 99238 HOSP IP/OBS DSCHRG MGMT 30/<: CPT | Performed by: STUDENT IN AN ORGANIZED HEALTH CARE EDUCATION/TRAINING PROGRAM

## 2022-07-16 PROCEDURE — 6370000000 HC RX 637 (ALT 250 FOR IP): Performed by: STUDENT IN AN ORGANIZED HEALTH CARE EDUCATION/TRAINING PROGRAM

## 2022-07-16 PROCEDURE — 36415 COLL VENOUS BLD VENIPUNCTURE: CPT

## 2022-07-16 PROCEDURE — 80053 COMPREHEN METABOLIC PANEL: CPT

## 2022-07-16 PROCEDURE — G0378 HOSPITAL OBSERVATION PER HR: HCPCS

## 2022-07-16 PROCEDURE — 2580000003 HC RX 258: Performed by: INTERNAL MEDICINE

## 2022-07-16 RX ORDER — SULFAMETHOXAZOLE AND TRIMETHOPRIM 800; 160 MG/1; MG/1
1 TABLET ORAL EVERY 12 HOURS SCHEDULED
Qty: 14 TABLET | Refills: 0 | Status: SHIPPED | OUTPATIENT
Start: 2022-07-16 | End: 2022-07-23

## 2022-07-16 RX ADMIN — Medication 10 ML: at 08:50

## 2022-07-16 RX ADMIN — LISINOPRIL 20 MG: 20 TABLET ORAL at 08:49

## 2022-07-16 RX ADMIN — SULFAMETHOXAZOLE AND TRIMETHOPRIM 1 TABLET: 800; 160 TABLET ORAL at 08:49

## 2022-07-16 NOTE — DISCHARGE INSTRUCTIONS
Per Dr. Nancy Jones, patient to change dressing daily with neosporin and band-aid. Keep dressing clean and dry. Follow up in office in one week.

## 2022-07-16 NOTE — DISCHARGE SUMMARY
normal  Neck: neck supple and non tender without mass   Pulmonary/Chest: clear to auscultation bilaterally- no wheezes, rales or rhonchi, normal air movement, no respiratory distress  Cardiovascular: normal rate, normal S1 and S2 and no carotid bruits  Abdomen: soft, non-tender, non-distended, normal bowel sounds, no masses or organomegaly  Extremities: no cyanosis, no clubbing and no edema -- right great toe removed and dressed  Neurologic: no cranial nerve deficit and speech normal    I/O last 3 completed shifts: In: 200 [I.V.:200]  Out: -   No intake/output data recorded. LABS:  Recent Labs     07/14/22  1307 07/14/22 2255 07/16/22  0435    134 136   K 4.1 4.0 4.5    98 102   CO2 18* 20* 20*   BUN 25* 22* 21*   CREATININE 1.1 1.0 1.0   GLUCOSE 88 100* 104*   CALCIUM 9.5 9.5 9.2       Recent Labs     07/14/22  1307 07/14/22 2255 07/16/22  0435   WBC 6.8 6.8 7.1   RBC 5.02 5.06 4.88   HGB 16.2 16.7* 15.7   HCT 48.0 46.9 44.5   MCV 95.6 92.7 91.2   MCH 32.3 33.0 32.2   MCHC 33.8 35.6* 35.3*   RDW 12.2 11.8 11.9    235 208   MPV 9.9 9.3 9.6       No results for input(s): POCGLU in the last 72 hours. Imaging:  XR FOOT RIGHT (MIN 3 VIEWS)    Result Date: 7/14/2022  EXAMINATION: THREE XRAY VIEWS OF THE RIGHT FOOT 7/14/2022 10:29 pm COMPARISON: None. HISTORY: ORDERING SYSTEM PROVIDED HISTORY: foot infection TECHNOLOGIST PROVIDED HISTORY: Reason for exam:->foot infection FINDINGS: Evaluation is limited due to technique/positioning. No acute no acute fracture or dislocation is identified. There is evidence of previous ORIF of the medial and lateral malleolus. No surrounding lucency or hardware migration. The talar dome is intact     No acute findings.      US DUP LOWER EXTREMITIES BILATERAL VENOUS    Result Date: 7/15/2022  EXAMINATION: DUPLEX VENOUS ULTRASOUND OF THE BILATERAL LOWER EXTREMITIES7/15/2022 8:15 pm TECHNIQUE: Duplex ultrasound using B-mode/gray scaled imaging, Doppler spectral analysis and color flow Doppler was obtained of the deep venous structures of the lower bilateral extremities. COMPARISON: None. HISTORY: ORDERING SYSTEM PROVIDED HISTORY: rule in/out dvt TECHNOLOGIST PROVIDED HISTORY: Reason for exam:->rule in/out dvt What reading provider will be dictating this exam?->CRC FINDINGS: The visualized veins of the bilateral lower extremities are patent and free of echogenic thrombus. The veins demonstrate good compressibility with normal color flow study and spectral analysis. No evidence of DVT in either lower extremity. Patient Instructions:      Medication List        START taking these medications      sulfamethoxazole-trimethoprim 800-160 MG per tablet  Commonly known as: BACTRIM DS;SEPTRA DS  Take 1 tablet by mouth in the morning and 1 tablet before bedtime. Do all this for 7 days.             CONTINUE taking these medications      lisinopril 20 MG tablet  Commonly known as: PRINIVIL;ZESTRIL  Take 1 tablet by mouth daily               Where to Get Your Medications        These medications were sent to [address] [place] [address], [address] [place] [address], [address] [place] [address]      Phone: [phone]   sulfamethoxazole-trimethoprim 800-160 MG per tablet       30 minutes was spent in preparing discharge papers, discussing discharge with patient, medication review, etc.    Signed:  Electronically signed by ERNESTO Coello NP on 7/16/2022 at 9:58 AM

## 2022-07-16 NOTE — PROGRESS NOTES
[address] [place] [address] [facility]  Progress Note    SUBJECTIVE:  Chief Complaint   Patient presents with    Toe Pain     right great toe pain, was seen at dr. office today and dr sent to ER, states he thought he had an ingrown toe nail but toenail has turned black and magets are coming out      Patient is tolerating medications. No reported adverse drug reactions. No nausea, vomiting, diarrhea. Review of systems:  As stated above in the chief complaint, otherwise negative. Medications:  Scheduled Meds:   lisinopril  20 mg Oral Daily    sodium chloride flush  5-40 mL IntraVENous 2 times per day    enoxaparin  40 mg SubCUTAneous BID    sulfamethoxazole-trimethoprim  1 tablet Oral 2 times per day     Continuous Infusions:   sodium chloride       PRN Meds:HYDROcodone 5 mg - acetaminophen, sodium chloride flush, sodium chloride, ondansetron **OR** ondansetron, polyethylene glycol, acetaminophen **OR** acetaminophen    OBJECTIVE:  /83   Pulse 78   Temp 98.7 °F (37.1 °C) (Oral)   Resp 17   Ht 6' 1\" (1.854 m)   Wt (!) 338 lb (153.3 kg)   SpO2 97%   BMI 44.59 kg/m²   Temp  Av.6 °F (37 °C)  Min: 97.7 °F (36.5 °C)  Max: 99.4 °F (37.4 °C)  Constitutional: The patient is awake, alert, and oriented. Skin: Warm and dry. No rashes were noted. HEENT: Round and reactive pupils. Moist mucous membranes. No ulcerations or thrush. Neck: Supple to movements. Chest: No use of accessory muscles to breathe. Symmetrical expansion. No wheezing, crackles or rhonchi. Cardiovascular: S1 and S2 are rhythmic and regular. No murmurs appreciated. Abdomen: Positive bowel sounds to auscultation. Benign to palpation. No masses felt. No hepatosplenomegaly. Extremities: No clubbing, no cyanosis, no edema.  Right great toe wrapped  Lines: peripheral    Laboratory and Tests Review:  Lab Results   Component Value Date    WBC 7.1 2022    WBC 6.8 2022    WBC 6.8 2022    HGB 15.7 07/16/2022    HCT 44.5 07/16/2022    MCV 91.2 07/16/2022     07/16/2022     Lab Results   Component Value Date    NEUTROABS 4.55 07/16/2022    NEUTROABS 3.67 07/14/2022    NEUTROABS 4.14 07/14/2022     No results found for: CRPHS  Lab Results   Component Value Date    ALT 21 07/16/2022    AST 20 07/16/2022    ALKPHOS 51 07/16/2022    BILITOT 0.9 07/16/2022     Lab Results   Component Value Date/Time     07/16/2022 04:35 AM    K 4.5 07/16/2022 04:35 AM     07/16/2022 04:35 AM    CO2 20 07/16/2022 04:35 AM    BUN 21 07/16/2022 04:35 AM    CREATININE 1.0 07/16/2022 04:35 AM    CREATININE 1.0 07/14/2022 10:55 PM    CREATININE 1.1 07/14/2022 01:07 PM    GFRAA >60 07/16/2022 04:35 AM    LABGLOM >60 07/16/2022 04:35 AM    GLUCOSE 104 07/16/2022 04:35 AM    PROT 6.6 07/16/2022 04:35 AM    LABALBU 4.3 07/16/2022 04:35 AM    CALCIUM 9.2 07/16/2022 04:35 AM    BILITOT 0.9 07/16/2022 04:35 AM    ALKPHOS 51 07/16/2022 04:35 AM    AST 20 07/16/2022 04:35 AM    ALT 21 07/16/2022 04:35 AM     Lab Results   Component Value Date    CRP 0.3 07/14/2022     Lab Results   Component Value Date    SEDRATE 2 07/14/2022     Radiology:      Microbiology:   No results found for: BC, ORG  No results found for: Mamie Montes, [facility]  No results found for: WNDABS  No results found for: RESPSMEAR  No results found for: MPNEUMO, CLAMYDCU, LABLEGI, AFBCX, FUNGSM, LABFUNG  No results found for: CULTRESP  No results found for: CXCATHTIP  No results found for: BFCS  No results found for: CXSURG  No results found for: LABURIN  No results found for: [facility]  No results for input(s): PROCAL in the last 72 hours. Assessment:  Right great toe ingrown nail with cellulitis/maggots infestation:  Reviewed podiatry notes: OR  S/p right great toenail avulsion 7/15     Plan:    Bactrim PO 1DS BID. Will follow OR notes and cx. Ok to DC on TMX x 7 days    ERNESTO Foster - CNS  10:05 AM  7/16/2022     The patient was discharged.     Mateo Coulter Cecy Galicia MD  7/16/2022  4:27 PM

## 2022-07-16 NOTE — PLAN OF CARE
Problem: Pain  Goal: Verbalizes/displays adequate comfort level or baseline comfort level  7/16/2022 1230 by Alem Carr RN  Outcome: Progressing  7/15/2022 2324 by Torres Ramos RN  Outcome: Progressing     Problem: ABCDS Injury Assessment  Goal: Absence of physical injury  7/16/2022 1230 by Alem Carr RN  Outcome: Progressing  7/15/2022 2324 by Torres Ramos RN  Outcome: Progressing     Problem: Skin/Tissue Integrity  Goal: Absence of new skin breakdown  Description: 1. Monitor for areas of redness and/or skin breakdown  2. Assess vascular access sites hourly  3. Every 4-6 hours minimum:  Change oxygen saturation probe site  4. Every 4-6 hours:  If on nasal continuous positive airway pressure, respiratory therapy assess nares and determine need for appliance change or resting period.   7/16/2022 1230 by Alem Carr RN  Outcome: Progressing  7/15/2022 2324 by Torres Ramos RN  Outcome: Progressing     Problem: Discharge Planning  Goal: Discharge to home or other facility with appropriate resources  7/16/2022 1230 by Alem Carr RN  Outcome: Progressing  7/15/2022 2324 by Torres Ramos RN  Outcome: Progressing

## 2022-07-18 ENCOUNTER — OFFICE VISIT (OUTPATIENT)
Dept: PODIATRY | Age: 57
End: 2022-07-18
Payer: COMMERCIAL

## 2022-07-18 ENCOUNTER — CARE COORDINATION (OUTPATIENT)
Dept: CARE COORDINATION | Age: 57
End: 2022-07-18

## 2022-07-18 DIAGNOSIS — L60.0 INGROWN NAIL: ICD-10-CM

## 2022-07-18 DIAGNOSIS — L03.031 CELLULITIS OF RIGHT TOE: ICD-10-CM

## 2022-07-18 DIAGNOSIS — B87.9 INFESTATION BY MAGGOTS: Primary | ICD-10-CM

## 2022-07-18 PROCEDURE — 99213 OFFICE O/P EST LOW 20 MIN: CPT | Performed by: PODIATRIST

## 2022-07-18 NOTE — LETTER
7/18/2022        I saw Barbara Renee follow-up today. Can return to work tomorrow full activity no restrictions 7/19/2022. Call anytime with any questions or concerns.         Sincerely,  Norma Reyez DPM      [address]  [address] [address]  Phone: [phone]  Fax: [phone]

## 2022-07-18 NOTE — PROGRESS NOTES
Patient in office to follow up post nail avulsion 07/14/2022. No complaints at this time. CC:    Follow-up total nail avulsion right great toenail day of surgery July 15, 2022    HPI:   Follow-up total nail avulsion right great toenail day of surgery July 15, 2022  Tolerating oral Bactrim well. Has been using bacitracin and a Band-Aid daily. Wearing regular shoes. Denies foot or ankle pain. Planning on returning to work tomorrow as he is pain-free and having no issues. Denies nausea vomiting fever chills shortness of breath. No calf pain. No additional pedal complaints. ROS:  Const: Denies constitutional symptoms  Musculo: Denies symptoms other than stated above  Skin: Denies symptoms other than stated above       Current Outpatient Medications:     sulfamethoxazole-trimethoprim (BACTRIM DS;SEPTRA DS) 800-160 MG per tablet, Take 1 tablet by mouth in the morning and 1 tablet before bedtime. Do all this for 7 days. , Disp: 14 tablet, Rfl: 0    lisinopril (PRINIVIL;ZESTRIL) 20 MG tablet, Take 1 tablet by mouth daily, Disp: 90 tablet, Rfl: 1  No Known Allergies    Past Medical History:   Diagnosis Date    Fracture     right Malleolus    Hyperlipidemia     Hypertension     Obesity     Pre-diabetes            There were no vitals filed for this visit. Work History/Social History: Foot and ankle history:     Focused Lower Extremity Physical Exam:    Neurovascular examination:    Dorsalis Pedis palpable bilateral.  Posterior tibialis palpable bilateral.    Capillary Refill Time:  Immediate return  Hair growth:  Symmetrical and bilateral   Skin:  Not atrophic  Edema: No edema bilateral feet or ankles. Neurologic:  Light touch intact bilateral.      Musculoskeletal/ Orthopedic examination:    Equinis: Absent bilateral  Dorsiflexion, plantarflexion, inversion, eversion bilateral 5 out of 5 muscle strength  Wiggling toes  Negative Homans  No pain right great toe.     Dermatology examination:    Status post total nail avulsion right great toenail. No drainage. Wound does not track or probe to bone. No surrounding erythema. No sign of maggots. Assessment and Plan:  Watson Phoenix was seen today for post-op check. Diagnoses and all orders for this visit:    Infestation by maggots    Ingrown nail    Cellulitis of right toe        Follow-up total nail avulsion right great toenail day of surgery July 15, 2022  Was discharged with oral Bactrim. Tolerating well. Can return to work tomorrow as tolerated. Full activity no restrictions. I did recommend bacitracin and Band-Aid once daily. Importance of changing Band-Aid every day. After 1 week can return to just a regular dry sock. Avoid barefoot. Importance of daily foot and ankle exam.  Follow-up in office 2 weeks      Return in about 2 weeks (around 8/1/2022). Seen By:  Kita Nieto DPM      Document was created using voice recognition software. Note was reviewed, however may contain grammatical errors.

## 2022-07-18 NOTE — CARE COORDINATION
Ana 45 Transitions Initial Follow Up Call    Call within 2 business days of discharge: Yes    Patient: John Mahmood Patient : 1965   MRN: <[id number]>  Reason for Admission: 7/15-22 Toe Infection  Discharge Date: 22 RARS: Readmission Risk Score: 5.2      Last Discharge [facility]       Date Complaint Diagnosis Description Type Department Provider    22 Toe Pain Maggot infestation . .. ED to Hosp-Admission (Discharged) (ADMITTED) [facility] 5W Glo Hanks MD; Geovany Frazier... Date/Time:  2022 11:43 AM  Attempted to reach patient by telephone for Initial Care Transition call. Call within 2 business days of discharge: Yes Left HIPPA compliant message requesting a return call. Will attempt to reach patient again. Isauro Veliz 33 / [facility] Transition Team  [phone]    CTN sent Inbox message to PCP  Staff to schedule 7 day Hospital F/U. Facility: [facility]    START taking:  sulfamethoxazole-trimethoprim (BACTRIM DS;SEPTRA  DS)    Instructions  Per Dr. Giovanna Ellison, patient to change dressing daily with neosporin and band-aid. Keep dressing clean and dry. Follow up  in office in one week.       Care Transitions 24 Hour Call    Care Transitions Interventions         Follow Up  Future Appointments   Date Time Provider Humble Garcia   2022  1:30 PM Keegan Mota DPM [facility]   2023  3:00 PM Lo Kerns  [address], LPN

## 2022-07-19 ENCOUNTER — CARE COORDINATION (OUTPATIENT)
Dept: CARE COORDINATION | Age: 57
End: 2022-07-19

## 2022-07-19 NOTE — CARE COORDINATION
Ana 45 Transitions Initial Follow Up Call    Call within 2 business days of discharge: Yes    Patient: Alissa Dao Patient : 1965   MRN: <[id number]>  Reason for Admission: 7/15-22 Toe Infection  Discharge Date: 22 RARS: Readmission Risk Score: 5.2      Last Discharge [address] [address]       Date Complaint Diagnosis Description Type Department Provider    22 Toe Pain Maggot infestation . .. ED to Hosp-Admission (Discharged) (ADMITTED) [facility] Orozco MD; Elijah Hu [place]... Date/Time:  2022 10:24 AM  Second attempt made to reach patient by telephone for Initial Care Transition call. Call within 2 business days of discharge: Yes Left HIPPA compliant message requesting a return call. CTN will sign off. Isauro Veliz / [facility] Transition Team  [phone]    Podiatry Hosp F/U completed 22    Facility: [facility]    START taking:  sulfamethoxazole-trimethoprim (BACTRIM DS;SEPTRA  DS)  Instructions  Per Dr. Sharona Mata, patient to change dressing daily with neosporin and band-aid. Keep dressing clean and dry.  Follow up  in office in one week    Care Transitions 24 Hour Call    Care Transitions Interventions         Follow Up  Future Appointments   Date Time Provider Humble Garcia   2022  2:00 PM Sultana Keller DPM [facility]   2023  3:00 PM Hira Finley  [address], LPN

## 2022-07-19 NOTE — PROGRESS NOTES
Physician Progress Note      Ignacio Balderas  [facility] #:                  [id number]  :                       1965  ADMIT DATE:       2022 11:59 PM  Kelly Small [place] DATE:        2022 8:39 AM  RESPONDING  PROVIDER #:        Jose Mari DPM          QUERY TEXT:    Dear Podiatry Consultant,    Patient admitted with right great toe nail maggot infestation , cellulitis. Per Op note dated 7/15 documentation of debridement. To accurately reflect the   procedure performed please document if debridement was excisional or   nonexcisional and the deepest depth of tissue removed as down to and   including: The medical record reflects the following:  Risk Factors: right great toe nail maggot infestation , cellulitis  Clinical Indicators: Per OP note 7/15 ,\". .. Procedure(s):RIGHT GREAT TOENAIL   AVULSION. Augusta Malik All necrotic tissue debrided sharply, healthy granular wound bed to   right hallux nail bed. Nail borders removed from medial and lateral   paronychia of the hallux. .Right hallux nail avulsion with debridement of   necrotic tissue was only carried down to the level of subcutaneous tissue   . Augusta Rast Augusta Rast Augusta Rast \"  Treatment: wound care, IV ATB    Thank you,  Lamar Miller RN CCDS  Clinical Documentation Improvement Specialist  [phone]  Options provided:  -- Non excisional debridement of subcutaneous tissue  -- Excisional debridement of subcutaneous tissue  -- Other - I will add my own diagnosis  -- Disagree - Not applicable / Not valid  -- Disagree - Clinically unable to determine / Unknown  -- Refer to Clinical Documentation Reviewer    PROVIDER RESPONSE TEXT:    Excisional debridement of subcutaneous tissue of right great toe was performed   during procedure on 7/15/22.     Query created by: Kana Max on 2022 2:44 PM      Electronically signed by:  Jose Mari DPM 2022 9:27 AM

## 2022-07-20 LAB
BLOOD CULTURE, ROUTINE: NORMAL
CULTURE, BLOOD 2: NORMAL

## 2022-11-11 ENCOUNTER — TELEPHONE (OUTPATIENT)
Dept: FAMILY MEDICINE CLINIC | Age: 57
End: 2022-11-11

## 2022-11-11 DIAGNOSIS — I10 ESSENTIAL HYPERTENSION: ICD-10-CM

## 2022-11-11 DIAGNOSIS — E78.2 MIXED HYPERLIPIDEMIA: Primary | ICD-10-CM

## 2022-11-11 DIAGNOSIS — R73.03 PRE-DIABETES: ICD-10-CM

## 2023-02-28 ENCOUNTER — OFFICE VISIT (OUTPATIENT)
Dept: FAMILY MEDICINE CLINIC | Age: 58
End: 2023-02-28
Payer: COMMERCIAL

## 2023-02-28 VITALS
HEIGHT: 73 IN | DIASTOLIC BLOOD PRESSURE: 88 MMHG | BODY MASS INDEX: 41.75 KG/M2 | HEART RATE: 78 BPM | OXYGEN SATURATION: 95 % | TEMPERATURE: 98.2 F | SYSTOLIC BLOOD PRESSURE: 138 MMHG | WEIGHT: 315 LBS

## 2023-02-28 DIAGNOSIS — E78.2 MIXED HYPERLIPIDEMIA: ICD-10-CM

## 2023-02-28 DIAGNOSIS — B37.49 GENITAL CANDIDIASIS IN MALE: ICD-10-CM

## 2023-02-28 DIAGNOSIS — I10 ESSENTIAL HYPERTENSION: Primary | ICD-10-CM

## 2023-02-28 PROCEDURE — 99214 OFFICE O/P EST MOD 30 MIN: CPT | Performed by: FAMILY MEDICINE

## 2023-02-28 PROCEDURE — 3079F DIAST BP 80-89 MM HG: CPT | Performed by: FAMILY MEDICINE

## 2023-02-28 PROCEDURE — 3075F SYST BP GE 130 - 139MM HG: CPT | Performed by: FAMILY MEDICINE

## 2023-02-28 RX ORDER — FLUCONAZOLE 100 MG/1
200 TABLET ORAL DAILY
Qty: 28 TABLET | Refills: 2 | Status: SHIPPED | OUTPATIENT
Start: 2023-02-28 | End: 2023-03-14

## 2023-02-28 RX ORDER — LISINOPRIL 20 MG/1
20 TABLET ORAL 2 TIMES DAILY
Qty: 180 TABLET | Refills: 0 | Status: SHIPPED | OUTPATIENT
Start: 2023-02-28

## 2023-02-28 ASSESSMENT — ENCOUNTER SYMPTOMS
WHEEZING: 0
BACK PAIN: 0
DIARRHEA: 0
ABDOMINAL PAIN: 0
SHORTNESS OF BREATH: 0
SINUS PAIN: 0
VOMITING: 0
CONSTIPATION: 0
COUGH: 0
NAUSEA: 0
EYE PAIN: 0
SORE THROAT: 0

## 2023-02-28 ASSESSMENT — PATIENT HEALTH QUESTIONNAIRE - PHQ9
SUM OF ALL RESPONSES TO PHQ QUESTIONS 1-9: 0
2. FEELING DOWN, DEPRESSED OR HOPELESS: 0
SUM OF ALL RESPONSES TO PHQ QUESTIONS 1-9: 0
SUM OF ALL RESPONSES TO PHQ QUESTIONS 1-9: 0
1. LITTLE INTEREST OR PLEASURE IN DOING THINGS: 0
SUM OF ALL RESPONSES TO PHQ QUESTIONS 1-9: 0
SUM OF ALL RESPONSES TO PHQ9 QUESTIONS 1 & 2: 0
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
## 2023-03-16 ENCOUNTER — TELEPHONE (OUTPATIENT)
Dept: FAMILY MEDICINE CLINIC | Age: 58
End: 2023-03-16

## 2023-03-16 DIAGNOSIS — B37.49 GENITAL CANDIDIASIS IN MALE: ICD-10-CM

## 2023-03-16 RX ORDER — FLUCONAZOLE 200 MG/1
200 TABLET ORAL DAILY
Qty: 14 TABLET | Refills: 2 | Status: SHIPPED | OUTPATIENT
Start: 2023-03-16 | End: 2023-03-30

## 2023-03-16 RX ORDER — FLUCONAZOLE 200 MG/1
200 TABLET ORAL DAILY
Qty: 14 TABLET | Refills: 1 | Status: CANCELLED | OUTPATIENT
Start: 2023-03-16 | End: 2023-04-13

## 2023-03-16 NOTE — TELEPHONE ENCOUNTER
Issue w/ Diflucan 100mg Rx. Insurance will not pay for two 100mg tablets daily since there is a 200mg tablet that is available. Pharmacy was able to do an override in Feb when the Rx was sent but it will not go through again. Verbal given to pharmacist to change to 200mg qd x 14 days w/ one refill.

## 2023-04-18 ENCOUNTER — APPOINTMENT (OUTPATIENT)
Dept: ULTRASOUND IMAGING | Age: 58
End: 2023-04-18
Payer: COMMERCIAL

## 2023-04-18 ENCOUNTER — APPOINTMENT (OUTPATIENT)
Dept: GENERAL RADIOLOGY | Age: 58
End: 2023-04-18
Payer: COMMERCIAL

## 2023-04-18 ENCOUNTER — OFFICE VISIT (OUTPATIENT)
Dept: FAMILY MEDICINE CLINIC | Age: 58
End: 2023-04-18
Payer: COMMERCIAL

## 2023-04-18 ENCOUNTER — HOSPITAL ENCOUNTER (INPATIENT)
Age: 58
LOS: 2 days | Discharge: HOME | End: 2023-04-20
Attending: EMERGENCY MEDICINE | Admitting: STUDENT IN AN ORGANIZED HEALTH CARE EDUCATION/TRAINING PROGRAM
Payer: COMMERCIAL

## 2023-04-18 ENCOUNTER — APPOINTMENT (OUTPATIENT)
Dept: CT IMAGING | Age: 58
End: 2023-04-18
Payer: COMMERCIAL

## 2023-04-18 VITALS
HEART RATE: 139 BPM | SYSTOLIC BLOOD PRESSURE: 130 MMHG | WEIGHT: 315 LBS | TEMPERATURE: 102.4 F | RESPIRATION RATE: 18 BRPM | OXYGEN SATURATION: 94 % | DIASTOLIC BLOOD PRESSURE: 88 MMHG | BODY MASS INDEX: 41.75 KG/M2 | HEIGHT: 73 IN

## 2023-04-18 DIAGNOSIS — J18.9 PNEUMONIA OF RIGHT LOWER LOBE DUE TO INFECTIOUS ORGANISM: Primary | ICD-10-CM

## 2023-04-18 DIAGNOSIS — J96.01 ACUTE RESPIRATORY FAILURE WITH HYPOXIA (HCC): ICD-10-CM

## 2023-04-18 PROBLEM — A41.9 SEPSIS (HCC): Status: ACTIVE | Noted: 2023-04-18

## 2023-04-18 PROBLEM — E66.813 CLASS 3 SEVERE OBESITY IN ADULT: Status: ACTIVE | Noted: 2023-04-18

## 2023-04-18 PROBLEM — R73.9 HYPERGLYCEMIA: Status: ACTIVE | Noted: 2023-04-18

## 2023-04-18 PROBLEM — E66.01 CLASS 3 SEVERE OBESITY IN ADULT (HCC): Status: ACTIVE | Noted: 2023-04-18

## 2023-04-18 LAB
ALBUMIN SERPL-MCNC: 3.4 G/DL (ref 3.5–5.2)
ALP SERPL-CCNC: 226 U/L (ref 40–129)
ALT SERPL-CCNC: 117 U/L (ref 0–40)
ANION GAP SERPL CALCULATED.3IONS-SCNC: 11 MMOL/L (ref 7–16)
AST SERPL-CCNC: 74 U/L (ref 0–39)
BASOPHILS # BLD: 0.12 E9/L (ref 0–0.2)
BASOPHILS NFR BLD: 1 % (ref 0–2)
BILIRUB SERPL-MCNC: 0.9 MG/DL (ref 0–1.2)
BUN SERPL-MCNC: 18 MG/DL (ref 6–20)
CALCIUM SERPL-MCNC: 9 MG/DL (ref 8.6–10.2)
CHLORIDE SERPL-SCNC: 92 MMOL/L (ref 98–107)
CO2 SERPL-SCNC: 27 MMOL/L (ref 22–29)
CREAT SERPL-MCNC: 1.1 MG/DL (ref 0.7–1.2)
EKG ATRIAL RATE: 112 BPM
EKG P AXIS: 19 DEGREES
EKG P-R INTERVAL: 168 MS
EKG Q-T INTERVAL: 332 MS
EKG QRS DURATION: 74 MS
EKG QTC CALCULATION (BAZETT): 453 MS
EKG R AXIS: 63 DEGREES
EKG T AXIS: 21 DEGREES
EKG VENTRICULAR RATE: 112 BPM
EOSINOPHIL # BLD: 0 E9/L (ref 0.05–0.5)
EOSINOPHIL NFR BLD: 0 % (ref 0–6)
ERYTHROCYTE [DISTWIDTH] IN BLOOD BY AUTOMATED COUNT: 12.5 FL (ref 11.5–15)
GLUCOSE SERPL-MCNC: 122 MG/DL (ref 74–99)
HCT VFR BLD AUTO: 43.4 % (ref 37–54)
HGB BLD-MCNC: 14.4 G/DL (ref 12.5–16.5)
INFLUENZA A BY PCR: NOT DETECTED
INFLUENZA B BY PCR: NOT DETECTED
LACTATE BLDV-SCNC: 2 MMOL/L (ref 0.5–1.9)
LYMPHOCYTES # BLD: 0.94 E9/L (ref 1.5–4)
LYMPHOCYTES NFR BLD: 6 % (ref 20–42)
MCH RBC QN AUTO: 31.1 PG (ref 26–35)
MCHC RBC AUTO-ENTMCNC: 33.2 % (ref 32–34.5)
MCV RBC AUTO: 93.7 FL (ref 80–99.9)
METAMYELOCYTES NFR BLD MANUAL: 5 % (ref 0–1)
MONOCYTES # BLD: 0.35 E9/L (ref 0.1–0.95)
MONOCYTES NFR BLD: 3 % (ref 2–12)
MYELOCYTES NFR BLD MANUAL: 2 % (ref 0–0)
NEUTROPHILS # BLD: 10.27 E9/L (ref 1.8–7.3)
NEUTS SEG NFR BLD: 80 % (ref 43–80)
PLATELET # BLD AUTO: 468 E9/L (ref 130–450)
PMV BLD AUTO: 8.7 FL (ref 7–12)
POTASSIUM SERPL-SCNC: 4.1 MMOL/L (ref 3.5–5)
PROMYELOCYTES NFR BLD MANUAL: 1 % (ref 0–0)
PROT SERPL-MCNC: 7.4 G/DL (ref 6.4–8.3)
RBC # BLD AUTO: 4.63 E12/L (ref 3.8–5.8)
RBC MORPH BLD: NORMAL
SARS-COV-2 RDRP RESP QL NAA+PROBE: NOT DETECTED
SODIUM SERPL-SCNC: 130 MMOL/L (ref 132–146)
TROPONIN, HIGH SENSITIVITY: 8 NG/L (ref 0–11)
VARIANT LYMPHS NFR BLD: 2 % (ref 0–4)
WBC # BLD: 11.8 E9/L (ref 4.5–11.5)

## 2023-04-18 PROCEDURE — 99222 1ST HOSP IP/OBS MODERATE 55: CPT | Performed by: STUDENT IN AN ORGANIZED HEALTH CARE EDUCATION/TRAINING PROGRAM

## 2023-04-18 PROCEDURE — 71275 CT ANGIOGRAPHY CHEST: CPT

## 2023-04-18 PROCEDURE — APPSS45 APP SPLIT SHARED TIME 31-45 MINUTES

## 2023-04-18 PROCEDURE — 6360000004 HC RX CONTRAST MEDICATION: Performed by: RADIOLOGY

## 2023-04-18 PROCEDURE — 76705 ECHO EXAM OF ABDOMEN: CPT

## 2023-04-18 PROCEDURE — 87502 INFLUENZA DNA AMP PROBE: CPT

## 2023-04-18 PROCEDURE — 2580000003 HC RX 258

## 2023-04-18 PROCEDURE — 2580000003 HC RX 258: Performed by: EMERGENCY MEDICINE

## 2023-04-18 PROCEDURE — 99285 EMERGENCY DEPT VISIT HI MDM: CPT

## 2023-04-18 PROCEDURE — 93005 ELECTROCARDIOGRAM TRACING: CPT | Performed by: PHYSICIAN ASSISTANT

## 2023-04-18 PROCEDURE — 84484 ASSAY OF TROPONIN QUANT: CPT

## 2023-04-18 PROCEDURE — 87040 BLOOD CULTURE FOR BACTERIA: CPT

## 2023-04-18 PROCEDURE — 99214 OFFICE O/P EST MOD 30 MIN: CPT | Performed by: FAMILY MEDICINE

## 2023-04-18 PROCEDURE — 96374 THER/PROPH/DIAG INJ IV PUSH: CPT

## 2023-04-18 PROCEDURE — 87449 NOS EACH ORGANISM AG IA: CPT

## 2023-04-18 PROCEDURE — 3075F SYST BP GE 130 - 139MM HG: CPT | Performed by: FAMILY MEDICINE

## 2023-04-18 PROCEDURE — 6360000002 HC RX W HCPCS: Performed by: EMERGENCY MEDICINE

## 2023-04-18 PROCEDURE — 87324 CLOSTRIDIUM AG IA: CPT

## 2023-04-18 PROCEDURE — 3079F DIAST BP 80-89 MM HG: CPT | Performed by: FAMILY MEDICINE

## 2023-04-18 PROCEDURE — 1200000000 HC SEMI PRIVATE

## 2023-04-18 PROCEDURE — 71045 X-RAY EXAM CHEST 1 VIEW: CPT

## 2023-04-18 PROCEDURE — 80053 COMPREHEN METABOLIC PANEL: CPT

## 2023-04-18 PROCEDURE — 6370000000 HC RX 637 (ALT 250 FOR IP): Performed by: EMERGENCY MEDICINE

## 2023-04-18 PROCEDURE — 93010 ELECTROCARDIOGRAM REPORT: CPT | Performed by: INTERNAL MEDICINE

## 2023-04-18 PROCEDURE — 85025 COMPLETE CBC W/AUTO DIFF WBC: CPT

## 2023-04-18 PROCEDURE — 87635 SARS-COV-2 COVID-19 AMP PRB: CPT

## 2023-04-18 PROCEDURE — 0202U NFCT DS 22 TRGT SARS-COV-2: CPT

## 2023-04-18 PROCEDURE — 83605 ASSAY OF LACTIC ACID: CPT

## 2023-04-18 RX ORDER — SODIUM CHLORIDE 9 MG/ML
INJECTION, SOLUTION INTRAVENOUS PRN
Status: DISCONTINUED | OUTPATIENT
Start: 2023-04-18 | End: 2023-04-20 | Stop reason: HOSPADM

## 2023-04-18 RX ORDER — 0.9 % SODIUM CHLORIDE 0.9 %
30 INTRAVENOUS SOLUTION INTRAVENOUS ONCE
Status: COMPLETED | OUTPATIENT
Start: 2023-04-18 | End: 2023-04-19

## 2023-04-18 RX ORDER — ACETAMINOPHEN 650 MG/1
650 SUPPOSITORY RECTAL EVERY 6 HOURS PRN
Status: DISCONTINUED | OUTPATIENT
Start: 2023-04-18 | End: 2023-04-20 | Stop reason: HOSPADM

## 2023-04-18 RX ORDER — DOXYCYCLINE HYCLATE 100 MG/1
100 CAPSULE ORAL ONCE
Status: COMPLETED | OUTPATIENT
Start: 2023-04-18 | End: 2023-04-18

## 2023-04-18 RX ORDER — SODIUM CHLORIDE 9 MG/ML
INJECTION, SOLUTION INTRAVENOUS CONTINUOUS
Status: DISCONTINUED | OUTPATIENT
Start: 2023-04-18 | End: 2023-04-19

## 2023-04-18 RX ORDER — SODIUM CHLORIDE 0.9 % (FLUSH) 0.9 %
5-40 SYRINGE (ML) INJECTION PRN
Status: DISCONTINUED | OUTPATIENT
Start: 2023-04-18 | End: 2023-04-20 | Stop reason: HOSPADM

## 2023-04-18 RX ORDER — ENOXAPARIN SODIUM 100 MG/ML
40 INJECTION SUBCUTANEOUS DAILY
Status: DISCONTINUED | OUTPATIENT
Start: 2023-04-19 | End: 2023-04-19 | Stop reason: DRUGHIGH

## 2023-04-18 RX ORDER — BENZONATATE 100 MG/1
100 CAPSULE ORAL 3 TIMES DAILY PRN
Status: DISCONTINUED | OUTPATIENT
Start: 2023-04-18 | End: 2023-04-20 | Stop reason: HOSPADM

## 2023-04-18 RX ORDER — ACETAMINOPHEN 325 MG/1
650 TABLET ORAL EVERY 6 HOURS PRN
Status: DISCONTINUED | OUTPATIENT
Start: 2023-04-18 | End: 2023-04-20 | Stop reason: HOSPADM

## 2023-04-18 RX ORDER — SODIUM CHLORIDE 0.9 % (FLUSH) 0.9 %
5-40 SYRINGE (ML) INJECTION EVERY 12 HOURS SCHEDULED
Status: DISCONTINUED | OUTPATIENT
Start: 2023-04-18 | End: 2023-04-20 | Stop reason: HOSPADM

## 2023-04-18 RX ORDER — ONDANSETRON 2 MG/ML
4 INJECTION INTRAMUSCULAR; INTRAVENOUS EVERY 6 HOURS PRN
Status: DISCONTINUED | OUTPATIENT
Start: 2023-04-18 | End: 2023-04-20 | Stop reason: HOSPADM

## 2023-04-18 RX ORDER — IPRATROPIUM BROMIDE AND ALBUTEROL SULFATE 2.5; .5 MG/3ML; MG/3ML
1 SOLUTION RESPIRATORY (INHALATION) EVERY 4 HOURS PRN
Status: DISCONTINUED | OUTPATIENT
Start: 2023-04-18 | End: 2023-04-20 | Stop reason: HOSPADM

## 2023-04-18 RX ORDER — DOXYCYCLINE HYCLATE 100 MG/1
100 CAPSULE ORAL EVERY 12 HOURS SCHEDULED
Status: DISCONTINUED | OUTPATIENT
Start: 2023-04-19 | End: 2023-04-20 | Stop reason: HOSPADM

## 2023-04-18 RX ORDER — ONDANSETRON 4 MG/1
4 TABLET, ORALLY DISINTEGRATING ORAL EVERY 8 HOURS PRN
Status: DISCONTINUED | OUTPATIENT
Start: 2023-04-18 | End: 2023-04-20 | Stop reason: HOSPADM

## 2023-04-18 RX ADMIN — SODIUM CHLORIDE 2397 ML: 9 INJECTION, SOLUTION INTRAVENOUS at 17:00

## 2023-04-18 RX ADMIN — DOXYCYCLINE HYCLATE 100 MG: 100 CAPSULE ORAL at 20:08

## 2023-04-18 RX ADMIN — SODIUM CHLORIDE: 9 INJECTION, SOLUTION INTRAVENOUS at 22:35

## 2023-04-18 RX ADMIN — SODIUM CHLORIDE, PRESERVATIVE FREE 10 ML: 5 INJECTION INTRAVENOUS at 22:36

## 2023-04-18 RX ADMIN — IOPAMIDOL 75 ML: 755 INJECTION, SOLUTION INTRAVENOUS at 18:46

## 2023-04-18 RX ADMIN — CEFTRIAXONE SODIUM 2000 MG: 2 INJECTION, POWDER, FOR SOLUTION INTRAMUSCULAR; INTRAVENOUS at 20:07

## 2023-04-18 ASSESSMENT — PAIN DESCRIPTION - FREQUENCY
FREQUENCY: CONTINUOUS
FREQUENCY: CONTINUOUS

## 2023-04-18 ASSESSMENT — PAIN - FUNCTIONAL ASSESSMENT
PAIN_FUNCTIONAL_ASSESSMENT: 0-10
PAIN_FUNCTIONAL_ASSESSMENT: 0-10

## 2023-04-18 ASSESSMENT — ENCOUNTER SYMPTOMS
SINUS PRESSURE: 1
NAUSEA: 0
EYE REDNESS: 0
DIARRHEA: 0
PHOTOPHOBIA: 0
EYE PAIN: 0
WHEEZING: 1
VOMITING: 0
BLOOD IN STOOL: 0
SINUS PAIN: 0
ABDOMINAL PAIN: 0
BACK PAIN: 0
COUGH: 1
SORE THROAT: 0
SHORTNESS OF BREATH: 0
TROUBLE SWALLOWING: 0
EYE DISCHARGE: 0
ALLERGIC/IMMUNOLOGIC NEGATIVE: 1
CHEST TIGHTNESS: 0

## 2023-04-18 ASSESSMENT — PAIN DESCRIPTION - PAIN TYPE: TYPE: ACUTE PAIN

## 2023-04-18 ASSESSMENT — PAIN SCALES - GENERAL
PAINLEVEL_OUTOF10: 1
PAINLEVEL_OUTOF10: 2

## 2023-04-18 ASSESSMENT — PAIN DESCRIPTION - DESCRIPTORS: DESCRIPTORS: ACHING

## 2023-04-18 ASSESSMENT — LIFESTYLE VARIABLES
HOW OFTEN DO YOU HAVE A DRINK CONTAINING ALCOHOL: 2-3 TIMES A WEEK
HOW MANY STANDARD DRINKS CONTAINING ALCOHOL DO YOU HAVE ON A TYPICAL DAY: 3 OR 4

## 2023-04-18 ASSESSMENT — PAIN DESCRIPTION - LOCATION: LOCATION: HEAD

## 2023-04-18 NOTE — ED PROVIDER NOTES
HPI:  4/18/23,   Time: 3:22 PM EDT         Iron Vasquez is a 61 y.o. male presenting to the ED for cough and shortness of breath for past week and a half. Patient states he had fever of 103 earlier today. He is cough is been such a nonproductive but he did take a course of steroids approxi-1 week ago with no improvement. Get COVID test as an outpatient was negative as well. Denies history of smoking or lung disease otherwise. Denies chest pain or palpitations. Shortness of breath is worse with exertion. No hemoptysis    ROS:   Pertinent positives and negatives are stated within HPI, all other systems reviewed and are negative.  --------------------------------------------- PAST HISTORY ---------------------------------------------  Past Medical History:  has a past medical history of Fracture, Hyperlipidemia, Hypertension, Obesity, and Pre-diabetes. Past Surgical History:  has a past surgical history that includes Nasal fracture surgery (1970); Ankle fracture surgery (Right, 10/2/2019); Tonsillectomy; and Foot Debridement (Right, 7/15/2022). Social History:  reports that he has never smoked. He has never used smokeless tobacco. He reports that he does not currently use alcohol. He reports that he does not use drugs. Family History: family history is not on file. The patients home medications have been reviewed. Allergies: Patient has no known allergies.     -------------------------------------------------- RESULTS -------------------------------------------------  All laboratory and radiology results have been personally reviewed by myself   LABS:  Results for orders placed or performed during the hospital encounter of 04/18/23   COVID-19, Rapid    Specimen: Nasopharyngeal Swab   Result Value Ref Range    SARS-CoV-2, NAAT Not Detected Not Detected   RAPID INFLUENZA A/B ANTIGENS    Specimen: Nasopharyngeal   Result Value Ref Range    Influenza A by PCR Not Detected Not Detected

## 2023-04-18 NOTE — PROGRESS NOTES
23  Bry Car : 1965 Sex: male  Age: 61 y.o. Assessment and Plan:  Elisa Quinonez was seen today for shortness of breath, dizziness, cough and chills. Diagnoses and all orders for this visit:    Pneumonia of right lower lobe due to infectious organism    This patient is ill. Temperature, shortness of breath, cough, mild hypoxia, findings on exam that present like a right lower lobe pneumonia. He will be sent to [facility] emergency room for evaluation and treatment. Patient en route. Emergency room notified. Return if symptoms worsen or fail to improve. Chief Complaint   Patient presents with    Shortness of Breath     Symptoms for 10 days     Dizziness    Cough    Chills       Congestion, pressure, drainage, facial tenderness, chills, fever to 102.5, shortness of breath, onset 10 days ago. Seen in walk-in care last week where a lab COVID test was negative. This was after he took several negative test at home. He has continued to progress with increasing shortness of breath, cough, fever. OTC treatments minimally effective. Review of Systems   Constitutional:  Positive for chills, fatigue and fever. Negative for appetite change and unexpected weight change. HENT:  Positive for congestion, postnasal drip and sinus pressure. Negative for ear pain, hearing loss, sinus pain, sore throat and trouble swallowing. Eyes:  Negative for photophobia, pain, discharge and redness. Respiratory:  Positive for cough and wheezing. Negative for chest tightness and shortness of breath. Cardiovascular:  Negative for chest pain, palpitations and leg swelling. Gastrointestinal:  Negative for abdominal pain, blood in stool, diarrhea, nausea and vomiting. Endocrine: Negative. Genitourinary:  Negative for dysuria, flank pain, frequency and hematuria. Musculoskeletal:  Negative for arthralgias, back pain, joint swelling and myalgias. Skin: Negative.     Allergic/Immunologic:

## 2023-04-18 NOTE — H&P
[facility] History and Physical      CHIEF COMPLAINT:  Cough    History of Present Illness: This is a 61year old male with a past medical history of hypertension and hyperlipidemia who presents to the ED with cough and shortness of breath. States his symptoms began 10 days ago; his wife shared similar symptoms at the time. States he has had congestion, although his cough is nonproductive. Completed a course of steroids one week ago with no improvement. Was hypoxic today at PCP with oxygen saturation in low 80s on room air. Complains of headache, nausea, and diarrhea. Has had fevers up to 103 degrees with chills. Has some mild lightheadedness with standing. Denies chest pain or abdominal pain. CT scan consistent with right lower lobe pneumonia    Informant(s) for H&P: patient and chart review    REVIEW OF SYSTEMS:  A comprehensive review of systems was negative except for: what is in the HPI      PMH:  Past Medical History:   Diagnosis Date    Fracture     right Malleolus    Hyperlipidemia     Hypertension     Obesity     Pre-diabetes        Surgical History:  Past Surgical History:   Procedure Laterality Date    ANKLE FRACTURE SURGERY Right 10/2/2019    RIGHT MEDIAL MALLEOLUS OPEN REDUCTION INTERNAL FIXATION, OPEN REDUCTION INTERNAL FIXATION RIGHT SYNDESMOSIS performed by Franci Min MD [address] Right 7/15/2022    RIGHT GREAT TOENAIL AVULSION performed by Mere Willis DPM at [address]      with adenoidectomy       Medications Prior to Admission:    Prior to Admission medications    Medication Sig Start Date End Date Taking? Authorizing Provider   predniSONE (DELTASONE) 10 MG tablet Take 3 tablets by mouth daily for 2 days, THEN 2 tablets daily for 2 days, THEN 1 tablet daily for 2 days.  4/13/23 4/19/23  Rohit Barney MD   ondansetron (ZOFRAN) 4 MG tablet Take 1 tablet by mouth 3 times daily as needed for Nausea

## 2023-04-19 LAB
ALBUMIN SERPL-MCNC: 2.9 G/DL (ref 3.5–5.2)
ALP SERPL-CCNC: 180 U/L (ref 40–129)
ALT SERPL-CCNC: 91 U/L (ref 0–40)
ANION GAP SERPL CALCULATED.3IONS-SCNC: 13 MMOL/L (ref 7–16)
AST SERPL-CCNC: 48 U/L (ref 0–39)
B PARAP IS1001 DNA NPH QL NAA+NON-PROBE: NOT DETECTED
B PERT.PT PRMT NPH QL NAA+NON-PROBE: NOT DETECTED
BASOPHILS # BLD: 0 E9/L (ref 0–0.2)
BASOPHILS NFR BLD: 0 % (ref 0–2)
BILIRUB SERPL-MCNC: 0.6 MG/DL (ref 0–1.2)
BUN SERPL-MCNC: 17 MG/DL (ref 6–20)
C DIFF TOXIN/ANTIGEN: NORMAL
C PNEUM DNA NPH QL NAA+NON-PROBE: NOT DETECTED
CALCIUM SERPL-MCNC: 8.3 MG/DL (ref 8.6–10.2)
CHLORIDE SERPL-SCNC: 95 MMOL/L (ref 98–107)
CO2 SERPL-SCNC: 22 MMOL/L (ref 22–29)
CREAT SERPL-MCNC: 1 MG/DL (ref 0.7–1.2)
EOSINOPHIL # BLD: 0 E9/L (ref 0.05–0.5)
EOSINOPHIL NFR BLD: 0 % (ref 0–6)
ERYTHROCYTE [DISTWIDTH] IN BLOOD BY AUTOMATED COUNT: 12.8 FL (ref 11.5–15)
FLUAV RNA NPH QL NAA+NON-PROBE: NOT DETECTED
FLUBV RNA NPH QL NAA+NON-PROBE: NOT DETECTED
GLUCOSE SERPL-MCNC: 107 MG/DL (ref 74–99)
HADV DNA NPH QL NAA+NON-PROBE: NOT DETECTED
HBA1C MFR BLD: 5.9 % (ref 4–5.6)
HCOV 229E RNA NPH QL NAA+NON-PROBE: NOT DETECTED
HCOV HKU1 RNA NPH QL NAA+NON-PROBE: NOT DETECTED
HCOV NL63 RNA NPH QL NAA+NON-PROBE: NOT DETECTED
HCOV OC43 RNA NPH QL NAA+NON-PROBE: NOT DETECTED
HCT VFR BLD AUTO: 39.1 % (ref 37–54)
HGB BLD-MCNC: 13 G/DL (ref 12.5–16.5)
HMPV RNA NPH QL NAA+NON-PROBE: NOT DETECTED
HPIV1 RNA NPH QL NAA+NON-PROBE: NOT DETECTED
HPIV2 RNA NPH QL NAA+NON-PROBE: NOT DETECTED
HPIV3 RNA NPH QL NAA+NON-PROBE: NOT DETECTED
HPIV4 RNA NPH QL NAA+NON-PROBE: NOT DETECTED
LEGIONELLA AG UR QL: NORMAL
LYMPHOCYTES # BLD: 1.6 E9/L (ref 1.5–4)
LYMPHOCYTES NFR BLD: 13.9 % (ref 20–42)
M PNEUMO DNA NPH QL NAA+NON-PROBE: NOT DETECTED
MAGNESIUM SERPL-MCNC: 1.8 MG/DL (ref 1.6–2.6)
MCH RBC QN AUTO: 31.6 PG (ref 26–35)
MCHC RBC AUTO-ENTMCNC: 33.2 % (ref 32–34.5)
MCV RBC AUTO: 95.1 FL (ref 80–99.9)
METAMYELOCYTES NFR BLD MANUAL: 4.3 % (ref 0–1)
MONOCYTES # BLD: 0.8 E9/L (ref 0.1–0.95)
MONOCYTES NFR BLD: 7 % (ref 2–12)
MYELOCYTES NFR BLD MANUAL: 1.7 % (ref 0–0)
NEUTROPHILS # BLD: 8.89 E9/L (ref 1.8–7.3)
NEUTS SEG NFR BLD: 72.2 % (ref 43–80)
NRBC BLD-RTO: 0 /100 WBC
PLATELET # BLD AUTO: 402 E9/L (ref 130–450)
PMV BLD AUTO: 8.9 FL (ref 7–12)
POTASSIUM SERPL-SCNC: 3.4 MMOL/L (ref 3.5–5)
PROCALCITONIN: 0.34 NG/ML (ref 0–0.08)
PROMYELOCYTES NFR BLD MANUAL: 0.9 % (ref 0–0)
PROT SERPL-MCNC: 6.5 G/DL (ref 6.4–8.3)
RBC # BLD AUTO: 4.11 E12/L (ref 3.8–5.8)
RSV RNA NPH QL NAA+NON-PROBE: NOT DETECTED
RV+EV RNA NPH QL NAA+NON-PROBE: NOT DETECTED
S PNEUM AG SPEC QL: NORMAL
SARS-COV-2 RNA NPH QL NAA+NON-PROBE: NOT DETECTED
SODIUM SERPL-SCNC: 130 MMOL/L (ref 132–146)
WBC # BLD: 11.4 E9/L (ref 4.5–11.5)

## 2023-04-19 PROCEDURE — 99233 SBSQ HOSP IP/OBS HIGH 50: CPT | Performed by: INTERNAL MEDICINE

## 2023-04-19 PROCEDURE — 6360000002 HC RX W HCPCS

## 2023-04-19 PROCEDURE — 2580000003 HC RX 258

## 2023-04-19 PROCEDURE — 1200000000 HC SEMI PRIVATE

## 2023-04-19 PROCEDURE — 6360000002 HC RX W HCPCS: Performed by: INTERNAL MEDICINE

## 2023-04-19 PROCEDURE — 6370000000 HC RX 637 (ALT 250 FOR IP): Performed by: INTERNAL MEDICINE

## 2023-04-19 PROCEDURE — 85025 COMPLETE CBC W/AUTO DIFF WBC: CPT

## 2023-04-19 PROCEDURE — 36415 COLL VENOUS BLD VENIPUNCTURE: CPT

## 2023-04-19 PROCEDURE — 83036 HEMOGLOBIN GLYCOSYLATED A1C: CPT

## 2023-04-19 PROCEDURE — 84145 PROCALCITONIN (PCT): CPT

## 2023-04-19 PROCEDURE — 83735 ASSAY OF MAGNESIUM: CPT

## 2023-04-19 PROCEDURE — 6370000000 HC RX 637 (ALT 250 FOR IP)

## 2023-04-19 PROCEDURE — 80053 COMPREHEN METABOLIC PANEL: CPT

## 2023-04-19 PROCEDURE — 2700000000 HC OXYGEN THERAPY PER DAY

## 2023-04-19 RX ORDER — ENOXAPARIN SODIUM 100 MG/ML
40 INJECTION SUBCUTANEOUS 2 TIMES DAILY
Status: DISCONTINUED | OUTPATIENT
Start: 2023-04-19 | End: 2023-04-20 | Stop reason: HOSPADM

## 2023-04-19 RX ORDER — LISINOPRIL 20 MG/1
20 TABLET ORAL 2 TIMES DAILY
Status: DISCONTINUED | OUTPATIENT
Start: 2023-04-19 | End: 2023-04-20 | Stop reason: HOSPADM

## 2023-04-19 RX ORDER — SODIUM CHLORIDE AND POTASSIUM CHLORIDE 300; 900 MG/100ML; MG/100ML
INJECTION, SOLUTION INTRAVENOUS CONTINUOUS
Status: DISCONTINUED | OUTPATIENT
Start: 2023-04-19 | End: 2023-04-20 | Stop reason: HOSPADM

## 2023-04-19 RX ADMIN — DOXYCYCLINE HYCLATE 100 MG: 100 CAPSULE ORAL at 20:32

## 2023-04-19 RX ADMIN — LISINOPRIL 20 MG: 20 TABLET ORAL at 18:16

## 2023-04-19 RX ADMIN — ENOXAPARIN SODIUM 40 MG: 100 INJECTION SUBCUTANEOUS at 08:10

## 2023-04-19 RX ADMIN — POTASSIUM CHLORIDE AND SODIUM CHLORIDE: 900; 300 INJECTION, SOLUTION INTRAVENOUS at 13:10

## 2023-04-19 RX ADMIN — SODIUM CHLORIDE, PRESERVATIVE FREE 10 ML: 5 INJECTION INTRAVENOUS at 19:41

## 2023-04-19 RX ADMIN — ENOXAPARIN SODIUM 40 MG: 100 INJECTION SUBCUTANEOUS at 20:32

## 2023-04-19 RX ADMIN — CEFTRIAXONE 1000 MG: 1 INJECTION, POWDER, FOR SOLUTION INTRAMUSCULAR; INTRAVENOUS at 19:41

## 2023-04-19 RX ADMIN — DOXYCYCLINE HYCLATE 100 MG: 100 CAPSULE ORAL at 08:10

## 2023-04-19 RX ADMIN — LISINOPRIL 20 MG: 20 TABLET ORAL at 13:15

## 2023-04-19 RX ADMIN — SODIUM CHLORIDE: 9 INJECTION, SOLUTION INTRAVENOUS at 09:45

## 2023-04-19 RX ADMIN — ACETAMINOPHEN 650 MG: 325 TABLET ORAL at 01:10

## 2023-04-19 ASSESSMENT — PAIN SCALES - WONG BAKER: WONGBAKER_NUMERICALRESPONSE: 0

## 2023-04-19 ASSESSMENT — PAIN DESCRIPTION - LOCATION
LOCATION: HEAD
LOCATION: HEAD

## 2023-04-19 ASSESSMENT — PAIN DESCRIPTION - DESCRIPTORS: DESCRIPTORS: ACHING

## 2023-04-19 ASSESSMENT — PAIN SCALES - GENERAL
PAINLEVEL_OUTOF10: 2
PAINLEVEL_OUTOF10: 0
PAINLEVEL_OUTOF10: 3

## 2023-04-19 ASSESSMENT — PAIN - FUNCTIONAL ASSESSMENT: PAIN_FUNCTIONAL_ASSESSMENT: ACTIVITIES ARE NOT PREVENTED

## 2023-04-19 NOTE — ED NOTES
ED to Inpatient Handoff Report    Notified [facility] that electronic handoff available and patient ready for transport to room 547. Safety Risks: None identified    Patient in Restraints: no    Constant Observer or Patient : no    Telemetry Monitoring Ordered :Yes           Order to transfer to unit without monitor:YES    Last MEWS: 3   Time completed: 2105      Vitals:    04/18/23 1433 04/18/23 1515 04/18/23 2105   BP: 131/79 (!) 168/91 (!) 143/94   Pulse: (!) 129 (!) 110 (!) 111   Resp: 18 20 18   Temp: 99.2 °F (37.3 °C) 98 °F (36.7 °C) 98.4 °F (36.9 °C)   TempSrc: Temporal Oral Oral   SpO2: 95% 96% 93%       Opportunity for questions and clarification was provided.         Cooper Acevedo RN  04/18/23 2115

## 2023-04-19 NOTE — CARE COORDINATION
Neg for covid; pt admitted for pneumonia. independent from home with spouse. Has PCP; currently on 4lnc; (new) has NO home CPAP/BIPAP. will monitor for poss home 02 needs. Iv /po atb's.will attempt to wean 02. Using IS. Plan is home at discharge, no needs. David Cruz. Case Management Assessment  Initial Evaluation    Date/Time of Evaluation: 4/19/2023 9:48 AM  Assessment Completed by: Yvonne Waller RN    If patient is discharged prior to next notation, then this note serves as note for discharge by case management. Patient Name: Joao Hill                   YOB: 1965  Diagnosis: Pneumonia due to organism [J18.9]  Acute respiratory failure with hypoxia ([facility] [address] [J96.01]  Pneumonia of right lower lobe due to infectious organism [J18.9]  Sepsis ([facility] [address] [A41.9]                   Date / Time: 4/18/2023  3:11 PM    Patient Admission Status: Inpatient   Readmission Risk (Low < 19, Mod (19-27), High > 27): Readmission Risk Score: 7.5    Current PCP: Naga Prado, DO  PCP verified by CM? Yes    Chart Reviewed: Yes      History Provided by:    Patient Orientation: Alert and Oriented    Patient Cognition: Alert    Hospitalization in the last 30 days (Readmission):  No    If yes, Readmission Assessment in CM Navigator will be completed. Advance Directives:      Code Status: Full Code   Patient's Primary Decision Maker is:      Primary Decision MakerCalraymundo Rust Spouse - [phone]    Discharge Planning:    Patient lives with: Spouse/Significant Other Type of Home: House  Primary Care Giver: Self  Patient Support Systems include: Spouse/Significant Other   Current Financial resources:    Current community resources:    Current services prior to admission: None            Current DME:              Type of Home Care services:  None    ADLS  Prior functional level: Independent in ADLs/IADLs  Current functional level:  Independent in ADLs/IADLs    PT AM-PAC:   /24  OT AM-PAC:

## 2023-04-20 ENCOUNTER — TELEPHONE (OUTPATIENT)
Dept: FAMILY MEDICINE CLINIC | Age: 58
End: 2023-04-20

## 2023-04-20 VITALS
HEART RATE: 87 BPM | TEMPERATURE: 97.9 F | RESPIRATION RATE: 16 BRPM | WEIGHT: 315 LBS | SYSTOLIC BLOOD PRESSURE: 123 MMHG | BODY MASS INDEX: 41.75 KG/M2 | HEIGHT: 73 IN | DIASTOLIC BLOOD PRESSURE: 78 MMHG | OXYGEN SATURATION: 92 %

## 2023-04-20 LAB
ANION GAP SERPL CALCULATED.3IONS-SCNC: 9 MMOL/L (ref 7–16)
BUN SERPL-MCNC: 13 MG/DL (ref 6–20)
CALCIUM SERPL-MCNC: 8.3 MG/DL (ref 8.6–10.2)
CHLORIDE SERPL-SCNC: 103 MMOL/L (ref 98–107)
CO2 SERPL-SCNC: 21 MMOL/L (ref 22–29)
CREAT SERPL-MCNC: 0.8 MG/DL (ref 0.7–1.2)
ERYTHROCYTE [DISTWIDTH] IN BLOOD BY AUTOMATED COUNT: 12.8 FL (ref 11.5–15)
GLUCOSE SERPL-MCNC: 122 MG/DL (ref 74–99)
HCT VFR BLD AUTO: 37.7 % (ref 37–54)
HGB BLD-MCNC: 12.3 G/DL (ref 12.5–16.5)
MCH RBC QN AUTO: 31.5 PG (ref 26–35)
MCHC RBC AUTO-ENTMCNC: 32.6 % (ref 32–34.5)
MCV RBC AUTO: 96.7 FL (ref 80–99.9)
PLATELET # BLD AUTO: 306 E9/L (ref 130–450)
PMV BLD AUTO: 8.9 FL (ref 7–12)
POTASSIUM SERPL-SCNC: 4.1 MMOL/L (ref 3.5–5)
RBC # BLD AUTO: 3.9 E12/L (ref 3.8–5.8)
SODIUM SERPL-SCNC: 133 MMOL/L (ref 132–146)
WBC # BLD: 7.8 E9/L (ref 4.5–11.5)

## 2023-04-20 PROCEDURE — 2580000003 HC RX 258

## 2023-04-20 PROCEDURE — 2700000000 HC OXYGEN THERAPY PER DAY

## 2023-04-20 PROCEDURE — 6360000002 HC RX W HCPCS

## 2023-04-20 PROCEDURE — 99239 HOSP IP/OBS DSCHRG MGMT >30: CPT | Performed by: INTERNAL MEDICINE

## 2023-04-20 PROCEDURE — 6370000000 HC RX 637 (ALT 250 FOR IP)

## 2023-04-20 PROCEDURE — 6370000000 HC RX 637 (ALT 250 FOR IP): Performed by: INTERNAL MEDICINE

## 2023-04-20 PROCEDURE — 36415 COLL VENOUS BLD VENIPUNCTURE: CPT

## 2023-04-20 PROCEDURE — 6360000002 HC RX W HCPCS: Performed by: INTERNAL MEDICINE

## 2023-04-20 PROCEDURE — 80048 BASIC METABOLIC PNL TOTAL CA: CPT

## 2023-04-20 PROCEDURE — 85027 COMPLETE CBC AUTOMATED: CPT

## 2023-04-20 RX ORDER — BENZONATATE 100 MG/1
100 CAPSULE ORAL 3 TIMES DAILY PRN
Qty: 21 CAPSULE | Refills: 0 | Status: SHIPPED | OUTPATIENT
Start: 2023-04-20 | End: 2023-04-26

## 2023-04-20 RX ORDER — LEVOFLOXACIN 750 MG/1
750 TABLET ORAL DAILY
Qty: 5 TABLET | Refills: 0 | Status: SHIPPED | OUTPATIENT
Start: 2023-04-20 | End: 2023-04-25

## 2023-04-20 RX ADMIN — DOXYCYCLINE HYCLATE 100 MG: 100 CAPSULE ORAL at 07:34

## 2023-04-20 RX ADMIN — LISINOPRIL 20 MG: 20 TABLET ORAL at 07:34

## 2023-04-20 RX ADMIN — POTASSIUM CHLORIDE AND SODIUM CHLORIDE: 900; 300 INJECTION, SOLUTION INTRAVENOUS at 02:05

## 2023-04-20 RX ADMIN — ENOXAPARIN SODIUM 40 MG: 100 INJECTION SUBCUTANEOUS at 07:34

## 2023-04-20 RX ADMIN — SODIUM CHLORIDE, PRESERVATIVE FREE 10 ML: 5 INJECTION INTRAVENOUS at 07:35

## 2023-04-20 ASSESSMENT — PAIN SCALES - GENERAL: PAINLEVEL_OUTOF10: 0

## 2023-04-20 NOTE — PROGRESS NOTES
Discharge instructions reviewed with Pt and wife. Voiced understanding.
Pulse ox was 94% on room air at rest.  Ambulated patient on room air. Oxygen saturation was 93% on room air while ambulating.  Recovery pulse ox was 94% on room air at rest.
This patient is on medication that requires renal, weight, and/or indication dose adjustment. Date Body Weight IBW  Adjusted BW SCr  CrCl Dialysis status   4/19/2023 (!) 337 lb 12.8 oz (153.2 kg)   Ideal body weight: 79.9 kg (176 lb 2.4 oz)  Adjusted ideal body weight: 109.2 kg (240 lb 12.9 oz) Serum creatinine: 1 mg/dL 04/19/23 0322  Estimated creatinine clearance: 124 mL/min N/a       Pharmacy has dose-adjusted the following medication(s):    Date Previous Order Adjusted Order   4/19/2023 Lovenox 40 mg daily Lovenox 40 mg bid       These changes were made per protocol according to the [facility] Clinical Guidance for Pharmacists. *Please note this dose may need readjusted if patient's condition changes. Please contact pharmacy with any questions regarding these changes.     JOSY Spears [facility]  4/19/2023  6:18 AM
speech normal        Recent Labs     04/18/23  1523 04/19/23  0322   * 130*   K 4.1 3.4*   CL 92* 95*   CO2 27 22   BUN 18 17   CREATININE 1.1 1.0   GLUCOSE 122* 107*   CALCIUM 9.0 8.3*       Recent Labs     04/18/23  1523 04/19/23  0322   WBC 11.8* 11.4   RBC 4.63 4.11   HGB 14.4 13.0   HCT 43.4 39.1   MCV 93.7 95.1   MCH 31.1 31.6   MCHC 33.2 33.2   RDW 12.5 12.8   * 402   MPV 8.7 8.9         Assessment:    Principal Problem:    Pneumonia due to organism  Active Problems:    Essential hypertension    Sepsis (HCC)    Class 3 severe obesity in adult [facility])    Acute respiratory failure with hypoxia (HCC)    Hyperglycemia  Resolved Problems:    * No resolved hospital problems. *      Plan:    Acute hypoxic respiratory failure 2/2 CAP - Patient is currently on 4 L NC. Will follow up with viral respiratory panel  Severe sepsis 2/2 CAP - Continue rocephin and doxycycline. Will check MRSA screen. Will follow up with urine studies  Acute transaminitis - Abdominal US is unremarkable. Will check hepatitis panel  HTN - Continue lisinopril  DVT prophylaxis - Lovenox      NOTE: This report was transcribed using voice recognition software. Every effort was made to ensure accuracy; however, inadvertent computerized transcription errors may be present.     Electronically signed by Fern Aase DO on 4/19/2023 at 11:23 AM

## 2023-04-20 NOTE — DISCHARGE SUMMARY
[facility] Physician Discharge Summary       No follow-up provider specified. Activity level: As tolerated     Dispo: Home      Condition on discharge: Stable     Patient ID:  Luc Alanis  [id number]  61 y.o.  1965    Admit date: 4/18/2023    Discharge date and time:  4/20/2023  1:25 PM    Admission Diagnoses: Principal Problem:    Pneumonia due to organism  Active Problems:    Essential hypertension    Sepsis ([facility] [address]    Class 3 severe obesity in adult [facility])    Acute respiratory failure with hypoxia (HCC)    Hyperglycemia  Resolved Problems:    * No resolved hospital problems. *      Discharge Diagnoses: Principal Problem:    Pneumonia due to organism  Active Problems:    Essential hypertension    Sepsis ([facility] [address]    Class 3 severe obesity in adult [facility])    Acute respiratory failure with hypoxia (HCC)    Hyperglycemia  Resolved Problems:    * No resolved hospital problems. *      Consults:  None    Procedures: None    Hospital Course:   Patient Luc Alanis is a 61 y.o. presented with Pneumonia due to organism [J18.9]  Acute respiratory failure with hypoxia ([address] [J96.01]  Pneumonia of right lower lobe due to infectious organism [J18.9]  Sepsis [facility]) [A399]    50-year-old presents the hospital with shortness of breath. Patient is placed on 4 L nasal cannula. He does not use oxygen at home. He is found to be in severe sepsis secondary community-acquired bacterial pneumonia. He was started on Rocephin as well as doxycycline: Patient has been weaned off of oxygen. He is currently medically stable for discharge.   Patient be discharged on levofloxacin to complete 7-day course of antibiotics    Discharge Exam:    General Appearance: alert and oriented to person, place and time and in no acute distress  Skin: warm and dry  Head: normocephalic and atraumatic  Eyes: pupils equal, round, and reactive to light, extraocular eye movements intact, conjunctivae normal  Neck: neck supple and

## 2023-04-20 NOTE — CARE COORDINATION
Iv atb's. 02 3lnc (new) attempt to wean. Will monitor for 02 needs. Plan is home with spouse, no needs. David Cruz.

## 2023-04-20 NOTE — TELEPHONE ENCOUNTER
Wife called to schedule a hospital follow up, per Gogo Avila okay to schedule 4/26/23. Also okay to provide work excuse keeping patient off through 4/26. Printed and taken up front.

## 2023-04-20 NOTE — PLAN OF CARE
Problem: Discharge Planning  Goal: Discharge to home or other facility with appropriate resources  4/20/2023 1257 by Sergio Pickett RN  Outcome: Adequate for Discharge  4/20/2023 0830 by Sergio Pickett RN  Outcome: Progressing

## 2023-04-20 NOTE — PLAN OF CARE
Problem: Chronic Conditions and Co-morbidities  Goal: Patient's chronic conditions and co-morbidity symptoms are monitored and maintained or improved  Flowsheets (Taken 4/20/2023 0049)  Care Plan - Patient's Chronic Conditions and Co-Morbidity Symptoms are Monitored and Maintained or Improved:   Monitor and assess patient's chronic conditions and comorbid symptoms for stability, deterioration, or improvement   Collaborate with multidisciplinary team to address chronic and comorbid conditions and prevent exacerbation or deterioration

## 2023-04-21 ENCOUNTER — CARE COORDINATION (OUTPATIENT)
Dept: CASE MANAGEMENT | Age: 58
End: 2023-04-21

## 2023-04-21 NOTE — CARE COORDINATION
[facility] Care Transitions Initial Follow Up Call    Call within 2 business days of discharge: Yes    Patient: Gerard Shook Patient : 1965   MRN: [id number]  Reason for Admission: pneumonia RLL   Discharge Date: 23 RARS: Readmission Risk Score: 7.6    Last Discharge  Street       Date Complaint Diagnosis Description Type Department Provider    23 Shortness of Breath; Fatigue Pneumonia of right lower lobe due to infectious organism . .. ED to Hosp-Admission (Discharged) (ADMITTED) [address] [facility]; Carmen Castro. .. First attempt to reach the patient for initial Care Transition call post hospital discharge. Voicemail box full, unable to leave a message.      Follow Up  Future Appointments   Date Time Provider Humble Garcia   2023 11:45 AM [facility]   2023  3:45 PM DO ABELINO Mayes [facility]     Chicho Angulo RN

## 2023-04-23 LAB
BACTERIA BLD CULT ORG #2: NORMAL
BACTERIA BLD CULT: NORMAL

## 2023-04-24 ENCOUNTER — CARE COORDINATION (OUTPATIENT)
Dept: CASE MANAGEMENT | Age: 58
End: 2023-04-24

## 2023-04-24 NOTE — CARE COORDINATION
[facility] Care Transitions Initial Follow Up Call    Call within 2 business days of discharge: Yes    Patient: Vibha Rodriguez Patient : 1965   MRN: [id number]  Reason for Admission: pneumonia RLL   Discharge Date: 23 RARS: Readmission Risk Score: 7.6      Last Discharge  Street       Date Complaint Diagnosis Description Type Department Provider    23 Shortness of Breath; Fatigue Pneumonia of right lower lobe due to infectious organism . .. ED to Hosp-Admission (Discharged) (ADMITTED) [address] [facility]; Consuelo Acosta. .. Second and final attempt to reach the patient for initial Care Transition call post hospital discharge. Call was answered, however, no one replied when asking for [facility]. CTN called back, no answer, message left with CTN's contact information requesting return phone call. Per chart review the patient is  active on [facility], CTN will send unable to reach letter in [facility].       Follow Up  Future Appointments   Date Time Provider Humble Garcia   2023 11:45 AM Lawerence Bumpers [place], DO [facility] [place] [facility]   2023  3:45 PM Aminata Ramírez DO [facility]     Danielle Suresh RN

## 2023-04-26 ENCOUNTER — OFFICE VISIT (OUTPATIENT)
Dept: FAMILY MEDICINE CLINIC | Age: 58
End: 2023-04-26
Payer: COMMERCIAL

## 2023-04-26 VITALS
WEIGHT: 315 LBS | TEMPERATURE: 97.3 F | HEIGHT: 73 IN | BODY MASS INDEX: 41.75 KG/M2 | HEART RATE: 98 BPM | DIASTOLIC BLOOD PRESSURE: 72 MMHG | SYSTOLIC BLOOD PRESSURE: 124 MMHG | OXYGEN SATURATION: 96 %

## 2023-04-26 DIAGNOSIS — J18.9 PNEUMONIA OF RIGHT LOWER LOBE DUE TO INFECTIOUS ORGANISM: Primary | ICD-10-CM

## 2023-04-26 PROCEDURE — 3078F DIAST BP <80 MM HG: CPT | Performed by: FAMILY MEDICINE

## 2023-04-26 PROCEDURE — 3074F SYST BP LT 130 MM HG: CPT | Performed by: FAMILY MEDICINE

## 2023-04-26 PROCEDURE — 99213 OFFICE O/P EST LOW 20 MIN: CPT | Performed by: FAMILY MEDICINE

## 2023-04-26 ASSESSMENT — ENCOUNTER SYMPTOMS
DIARRHEA: 0
VOMITING: 0
EYE PAIN: 0
NAUSEA: 0
SORE THROAT: 0
CONSTIPATION: 0
ABDOMINAL PAIN: 0
SINUS PAIN: 0
BACK PAIN: 0
WHEEZING: 0
COUGH: 0
SHORTNESS OF BREATH: 0

## 2023-04-26 NOTE — PROGRESS NOTES
/72   Pulse 98   Temp 97.3 °F (36.3 °C)   Ht 6' 1\" (1.854 m)   Wt (!) 330 lb (149.7 kg)   SpO2 96%   BMI 43.54 kg/m²     EXAM:   Physical Exam  Vitals and nursing note reviewed. Constitutional:       General: He is not in acute distress. Appearance: He is well-developed. He is obese. He is not ill-appearing or toxic-appearing. HENT:      Head: Normocephalic and atraumatic. Eyes:      Pupils: Pupils are equal, round, and reactive to light. Cardiovascular:      Rate and Rhythm: Normal rate and regular rhythm. Pulmonary:      Effort: Pulmonary effort is normal. No respiratory distress. Breath sounds: Normal breath sounds. No wheezing, rhonchi or rales. Musculoskeletal:      Cervical back: Normal range of motion. Skin:     General: Skin is warm and dry. Neurological:      Mental Status: He is alert and oriented to person, place, and time. Psychiatric:         Mood and Affect: Mood normal.         Thought Content: Thought content normal.        Mortimer Matin was seen today for follow-up from hospital.    Diagnoses and all orders for this visit:    Pneumonia of right lower lobe due to infectious organism  Comments:  trenton vogel'  still some shortness of breath  off right now  wants to RTW monday May 1st    Note for work  Off this week  Still having some shortness of breath but feels he will be able to return next week  Released as of Monday may 1st      I independently reviewed and updated the chief complaint, HPI, past medical and surgical history, medications, allergies and ROS as entered by the LPN. Seen by:   Quinn Haro DO

## 2023-07-03 DIAGNOSIS — E78.2 MIXED HYPERLIPIDEMIA: ICD-10-CM

## 2023-07-03 DIAGNOSIS — R73.03 PRE-DIABETES: ICD-10-CM

## 2023-07-03 DIAGNOSIS — I10 ESSENTIAL HYPERTENSION: ICD-10-CM

## 2023-07-03 LAB
ALBUMIN SERPL-MCNC: 4.4 G/DL (ref 3.5–5.2)
ALP SERPL-CCNC: 63 U/L (ref 40–129)
ALT SERPL-CCNC: 24 U/L (ref 0–40)
ANION GAP SERPL CALCULATED.3IONS-SCNC: 15 MMOL/L (ref 7–16)
AST SERPL-CCNC: 19 U/L (ref 0–39)
BASOPHILS # BLD: 0.06 E9/L (ref 0–0.2)
BASOPHILS NFR BLD: 0.9 % (ref 0–2)
BILIRUB SERPL-MCNC: 0.8 MG/DL (ref 0–1.2)
BUN SERPL-MCNC: 12 MG/DL (ref 6–20)
CALCIUM SERPL-MCNC: 9.5 MG/DL (ref 8.6–10.2)
CHLORIDE SERPL-SCNC: 105 MMOL/L (ref 98–107)
CHOLESTEROL, TOTAL: 239 MG/DL (ref 0–199)
CO2 SERPL-SCNC: 19 MMOL/L (ref 22–29)
CREAT SERPL-MCNC: 1 MG/DL (ref 0.7–1.2)
EOSINOPHIL # BLD: 0.16 E9/L (ref 0.05–0.5)
EOSINOPHIL NFR BLD: 2.4 % (ref 0–6)
ERYTHROCYTE [DISTWIDTH] IN BLOOD BY AUTOMATED COUNT: 12.4 FL (ref 11.5–15)
GLUCOSE SERPL-MCNC: 109 MG/DL (ref 74–99)
HBA1C MFR BLD: 5.8 % (ref 4–5.6)
HCT VFR BLD AUTO: 48.5 % (ref 37–54)
HDLC SERPL-MCNC: 55 MG/DL
HGB BLD-MCNC: 16.7 G/DL (ref 12.5–16.5)
IMM GRANULOCYTES # BLD: 0.02 E9/L
IMM GRANULOCYTES NFR BLD: 0.3 % (ref 0–5)
LDLC SERPL CALC-MCNC: 119 MG/DL (ref 0–99)
LYMPHOCYTES # BLD: 1.45 E9/L (ref 1.5–4)
LYMPHOCYTES NFR BLD: 21.6 % (ref 20–42)
MCH RBC QN AUTO: 32.1 PG (ref 26–35)
MCHC RBC AUTO-ENTMCNC: 34.4 % (ref 32–34.5)
MCV RBC AUTO: 93.3 FL (ref 80–99.9)
MONOCYTES # BLD: 0.57 E9/L (ref 0.1–0.95)
MONOCYTES NFR BLD: 8.5 % (ref 2–12)
NEUTROPHILS # BLD: 4.46 E9/L (ref 1.8–7.3)
NEUTS SEG NFR BLD: 66.3 % (ref 43–80)
PLATELET # BLD AUTO: 245 E9/L (ref 130–450)
PMV BLD AUTO: 10 FL (ref 7–12)
POTASSIUM SERPL-SCNC: 5 MMOL/L (ref 3.5–5)
PROT SERPL-MCNC: 7 G/DL (ref 6.4–8.3)
RBC # BLD AUTO: 5.2 E12/L (ref 3.8–5.8)
SODIUM SERPL-SCNC: 139 MMOL/L (ref 132–146)
TRIGL SERPL-MCNC: 324 MG/DL (ref 0–149)
VLDLC SERPL CALC-MCNC: 65 MG/DL
WBC # BLD: 6.7 E9/L (ref 4.5–11.5)

## 2023-07-06 ENCOUNTER — OFFICE VISIT (OUTPATIENT)
Dept: FAMILY MEDICINE CLINIC | Age: 58
End: 2023-07-06
Payer: COMMERCIAL

## 2023-07-06 VITALS
WEIGHT: 315 LBS | OXYGEN SATURATION: 98 % | SYSTOLIC BLOOD PRESSURE: 118 MMHG | BODY MASS INDEX: 41.75 KG/M2 | TEMPERATURE: 97.9 F | HEART RATE: 84 BPM | HEIGHT: 73 IN | DIASTOLIC BLOOD PRESSURE: 80 MMHG

## 2023-07-06 DIAGNOSIS — R73.03 PRE-DIABETES: ICD-10-CM

## 2023-07-06 DIAGNOSIS — Z00.01 ENCOUNTER FOR WELL ADULT EXAM WITH ABNORMAL FINDINGS: Primary | ICD-10-CM

## 2023-07-06 DIAGNOSIS — E78.2 MIXED HYPERLIPIDEMIA: ICD-10-CM

## 2023-07-06 DIAGNOSIS — I10 ESSENTIAL HYPERTENSION: ICD-10-CM

## 2023-07-06 PROCEDURE — 99396 PREV VISIT EST AGE 40-64: CPT | Performed by: FAMILY MEDICINE

## 2023-07-06 PROCEDURE — 3079F DIAST BP 80-89 MM HG: CPT | Performed by: FAMILY MEDICINE

## 2023-07-06 PROCEDURE — 3074F SYST BP LT 130 MM HG: CPT | Performed by: FAMILY MEDICINE

## 2023-07-06 RX ORDER — LISINOPRIL 20 MG/1
20 TABLET ORAL 2 TIMES DAILY
Qty: 180 TABLET | Refills: 1 | Status: SHIPPED | OUTPATIENT
Start: 2023-07-06

## 2023-07-06 ASSESSMENT — ENCOUNTER SYMPTOMS
COUGH: 0
NAUSEA: 0
WHEEZING: 0
EYE PAIN: 0
SINUS PAIN: 0
SORE THROAT: 0
BACK PAIN: 0
ABDOMINAL PAIN: 0
VOMITING: 0
CONSTIPATION: 0
DIARRHEA: 0
SHORTNESS OF BREATH: 0

## 2023-07-06 ASSESSMENT — PATIENT HEALTH QUESTIONNAIRE - PHQ9
SUM OF ALL RESPONSES TO PHQ QUESTIONS 1-9: 0
1. LITTLE INTEREST OR PLEASURE IN DOING THINGS: 0
SUM OF ALL RESPONSES TO PHQ QUESTIONS 1-9: 0
SUM OF ALL RESPONSES TO PHQ9 QUESTIONS 1 & 2: 0
2. FEELING DOWN, DEPRESSED OR HOPELESS: 0

## 2023-07-06 NOTE — PROGRESS NOTES
[facility])  Comments:  counseled about weight loss  not ready for referral to bariatrics yet  will recheck in 6 mos with labs        I independently reviewed and updated the chief complaint, HPI, past medical and surgical history, medications, allergies and ROS as entered by the LPN. Seen by:   Robbie Damon DO

## 2023-07-11 ENCOUNTER — TELEPHONE (OUTPATIENT)
Dept: PODIATRY | Age: 58
End: 2023-07-11

## 2023-07-11 NOTE — TELEPHONE ENCOUNTER
Pt called in he said that he was seen in the office for an infection of the right great toe previously. He said that the infection has returned, and it has maggots in it again. Please advise for scheduling.

## 2024-03-22 DIAGNOSIS — I10 ESSENTIAL HYPERTENSION: ICD-10-CM

## 2024-03-22 DIAGNOSIS — E78.2 MIXED HYPERLIPIDEMIA: ICD-10-CM

## 2024-03-22 DIAGNOSIS — R73.03 PRE-DIABETES: ICD-10-CM

## 2024-03-22 LAB
ABSOLUTE IMMATURE GRANULOCYTE: 0.03 K/UL (ref 0–0.58)
ALBUMIN SERPL-MCNC: 4.4 G/DL (ref 3.5–5.2)
ALP BLD-CCNC: 63 U/L (ref 40–129)
ALT SERPL-CCNC: 21 U/L (ref 0–40)
ANION GAP SERPL CALCULATED.3IONS-SCNC: 14 MMOL/L (ref 7–16)
AST SERPL-CCNC: 22 U/L (ref 0–39)
BASOPHILS ABSOLUTE: 0.05 K/UL (ref 0–0.2)
BASOPHILS RELATIVE PERCENT: 1 % (ref 0–2)
BILIRUB SERPL-MCNC: 0.9 MG/DL (ref 0–1.2)
BUN BLDV-MCNC: 21 MG/DL (ref 6–20)
CALCIUM SERPL-MCNC: 9.4 MG/DL (ref 8.6–10.2)
CHLORIDE BLD-SCNC: 102 MMOL/L (ref 98–107)
CHOLESTEROL: 224 MG/DL
CO2: 22 MMOL/L (ref 22–29)
CREAT SERPL-MCNC: 1 MG/DL (ref 0.7–1.2)
EOSINOPHILS ABSOLUTE: 0.14 K/UL (ref 0.05–0.5)
EOSINOPHILS RELATIVE PERCENT: 2 % (ref 0–6)
GFR SERPL CREATININE-BSD FRML MDRD: >60 ML/MIN/1.73M2
GLUCOSE BLD-MCNC: 89 MG/DL (ref 74–99)
HBA1C MFR BLD: 5.5 % (ref 4–5.6)
HCT VFR BLD CALC: 50.3 % (ref 37–54)
HDLC SERPL-MCNC: 55 MG/DL
HEMOGLOBIN: 16.6 G/DL (ref 12.5–16.5)
IMMATURE GRANULOCYTES: 0 % (ref 0–5)
LDL CHOLESTEROL: 152 MG/DL
LYMPHOCYTES ABSOLUTE: 1.45 K/UL (ref 1.5–4)
LYMPHOCYTES RELATIVE PERCENT: 22 % (ref 20–42)
MCH RBC QN AUTO: 32.1 PG (ref 26–35)
MCHC RBC AUTO-ENTMCNC: 33 G/DL (ref 32–34.5)
MCV RBC AUTO: 97.3 FL (ref 80–99.9)
MONOCYTES ABSOLUTE: 0.6 K/UL (ref 0.1–0.95)
MONOCYTES RELATIVE PERCENT: 9 % (ref 2–12)
NEUTROPHILS ABSOLUTE: 4.4 K/UL (ref 1.8–7.3)
NEUTROPHILS RELATIVE PERCENT: 66 % (ref 43–80)
PDW BLD-RTO: 12.6 % (ref 11.5–15)
PLATELET # BLD: 230 K/UL (ref 130–450)
PMV BLD AUTO: 9.8 FL (ref 7–12)
POTASSIUM SERPL-SCNC: 4.5 MMOL/L (ref 3.5–5)
RBC # BLD: 5.17 M/UL (ref 3.8–5.8)
SODIUM BLD-SCNC: 138 MMOL/L (ref 132–146)
TOTAL PROTEIN: 7.1 G/DL (ref 6.4–8.3)
TRIGL SERPL-MCNC: 87 MG/DL
VLDLC SERPL CALC-MCNC: 17 MG/DL
WBC # BLD: 6.7 K/UL (ref 4.5–11.5)

## 2024-04-15 ENCOUNTER — OFFICE VISIT (OUTPATIENT)
Dept: FAMILY MEDICINE CLINIC | Age: 59
End: 2024-04-15
Payer: COMMERCIAL

## 2024-04-15 VITALS
WEIGHT: 306 LBS | OXYGEN SATURATION: 97 % | TEMPERATURE: 98.2 F | HEIGHT: 73 IN | BODY MASS INDEX: 40.56 KG/M2 | DIASTOLIC BLOOD PRESSURE: 74 MMHG | HEART RATE: 68 BPM | SYSTOLIC BLOOD PRESSURE: 128 MMHG

## 2024-04-15 DIAGNOSIS — I10 ESSENTIAL HYPERTENSION: Primary | ICD-10-CM

## 2024-04-15 DIAGNOSIS — E66.01 OBESITY, CLASS III, BMI 40-49.9 (MORBID OBESITY) (HCC): ICD-10-CM

## 2024-04-15 DIAGNOSIS — E78.2 MIXED HYPERLIPIDEMIA: ICD-10-CM

## 2024-04-15 PROBLEM — J96.01 ACUTE RESPIRATORY FAILURE WITH HYPOXIA (HCC): Status: RESOLVED | Noted: 2023-04-18 | Resolved: 2024-04-15

## 2024-04-15 PROCEDURE — 3074F SYST BP LT 130 MM HG: CPT | Performed by: FAMILY MEDICINE

## 2024-04-15 PROCEDURE — 99213 OFFICE O/P EST LOW 20 MIN: CPT | Performed by: FAMILY MEDICINE

## 2024-04-15 PROCEDURE — 3078F DIAST BP <80 MM HG: CPT | Performed by: FAMILY MEDICINE

## 2024-04-15 RX ORDER — LISINOPRIL 20 MG/1
20 TABLET ORAL 2 TIMES DAILY
Qty: 180 TABLET | Refills: 1 | Status: SHIPPED | OUTPATIENT
Start: 2024-04-15

## 2024-04-15 ASSESSMENT — ENCOUNTER SYMPTOMS
EYE PAIN: 0
BACK PAIN: 0
NAUSEA: 0
ABDOMINAL PAIN: 0
COUGH: 0
DIARRHEA: 0
WHEEZING: 0
SHORTNESS OF BREATH: 0
VOMITING: 0
CONSTIPATION: 0
SORE THROAT: 0
SINUS PAIN: 0

## 2024-04-15 ASSESSMENT — PATIENT HEALTH QUESTIONNAIRE - PHQ9
SUM OF ALL RESPONSES TO PHQ QUESTIONS 1-9: 0
SUM OF ALL RESPONSES TO PHQ QUESTIONS 1-9: 0
2. FEELING DOWN, DEPRESSED OR HOPELESS: NOT AT ALL
SUM OF ALL RESPONSES TO PHQ9 QUESTIONS 1 & 2: 0
1. LITTLE INTEREST OR PLEASURE IN DOING THINGS: NOT AT ALL
SUM OF ALL RESPONSES TO PHQ QUESTIONS 1-9: 0
SUM OF ALL RESPONSES TO PHQ QUESTIONS 1-9: 0

## 2024-04-15 NOTE — PROGRESS NOTES
Tympanic membrane normal.      Left Ear: Tympanic membrane normal.      Nose: No congestion.   Eyes:      Pupils: Pupils are equal, round, and reactive to light.   Cardiovascular:      Rate and Rhythm: Normal rate and regular rhythm.   Pulmonary:      Effort: Pulmonary effort is normal. No respiratory distress.      Breath sounds: Normal breath sounds. No wheezing or rhonchi.   Musculoskeletal:      Cervical back: Normal range of motion.      Comments: Gait steady   Skin:     General: Skin is warm and dry.   Neurological:      Mental Status: He is alert and oriented to person, place, and time.   Psychiatric:         Mood and Affect: Mood normal.         Thought Content: Thought content normal.          Bassem was seen today for hypertension and skin problem.    Diagnoses and all orders for this visit:    Essential hypertension  Comments:  reading sbetter  he iso n 20mg once a day  they have been good at home  he is monitoring it  counseled aobut diet, low salt  Orders:  -     lisinopril (PRINIVIL;ZESTRIL) 20 MG tablet; Take 1 tablet by mouth in the morning and 1 tablet in the evening.  -     CBC with Auto Differential; Future  -     Comprehensive Metabolic Panel; Future  -     Lipid Panel; Future    Mixed hyperlipidemia  Comments:  he is down 30 more pounds, doing great  watching diet and moving more, LDL up but recheck in 6 mo  Orders:  -     Lipid Panel; Future    Obesity, Class III, BMI 40-49.9 (morbid obesity) (HCC)  Comments:  he is really working on weigh down over 50pounds all together  continue to watch diet and move  labs in 6 mo        I independently reviewed and updated the chief complaint, HPI, past medical and surgical history, medications, allergies and ROS as entered by the LPN.      Seen by:  Kiki Brooks DO

## 2025-05-02 ENCOUNTER — PATIENT MESSAGE (OUTPATIENT)
Dept: FAMILY MEDICINE CLINIC | Age: 60
End: 2025-05-02

## 2025-05-02 DIAGNOSIS — I10 ESSENTIAL HYPERTENSION: ICD-10-CM

## 2025-05-02 NOTE — TELEPHONE ENCOUNTER
Last Appointment:  4/15/2024  No future appointments.    Rx pended . I sent a my chart ticket on Friday to help him schedule appointment.

## 2025-05-05 RX ORDER — LISINOPRIL 20 MG/1
TABLET ORAL
Qty: 60 TABLET | Refills: 0 | OUTPATIENT
Start: 2025-05-05

## 2025-05-22 DIAGNOSIS — I10 ESSENTIAL HYPERTENSION: ICD-10-CM

## 2025-05-22 RX ORDER — LISINOPRIL 20 MG/1
20 TABLET ORAL 2 TIMES DAILY
Qty: 60 TABLET | Refills: 0 | Status: SHIPPED | OUTPATIENT
Start: 2025-05-22 | End: 2025-05-22 | Stop reason: SDUPTHER

## 2025-05-22 RX ORDER — LISINOPRIL 20 MG/1
20 TABLET ORAL 2 TIMES DAILY
Qty: 60 TABLET | Refills: 0 | Status: SHIPPED | OUTPATIENT
Start: 2025-05-22

## 2025-05-27 ENCOUNTER — OFFICE VISIT (OUTPATIENT)
Dept: FAMILY MEDICINE CLINIC | Age: 60
End: 2025-05-27
Payer: COMMERCIAL

## 2025-05-27 ENCOUNTER — RESULTS FOLLOW-UP (OUTPATIENT)
Dept: FAMILY MEDICINE CLINIC | Age: 60
End: 2025-05-27

## 2025-05-27 VITALS
OXYGEN SATURATION: 96 % | WEIGHT: 315 LBS | BODY MASS INDEX: 41.75 KG/M2 | DIASTOLIC BLOOD PRESSURE: 72 MMHG | TEMPERATURE: 98 F | HEART RATE: 76 BPM | HEIGHT: 73 IN | SYSTOLIC BLOOD PRESSURE: 118 MMHG

## 2025-05-27 DIAGNOSIS — R73.01 IMPAIRED FASTING BLOOD SUGAR: ICD-10-CM

## 2025-05-27 DIAGNOSIS — Z00.00 ENCOUNTER FOR WELL ADULT EXAM WITHOUT ABNORMAL FINDINGS: ICD-10-CM

## 2025-05-27 DIAGNOSIS — Z12.5 SCREENING FOR MALIGNANT NEOPLASM OF PROSTATE: ICD-10-CM

## 2025-05-27 DIAGNOSIS — L20.9 ATOPIC DERMATITIS, UNSPECIFIED TYPE: ICD-10-CM

## 2025-05-27 DIAGNOSIS — Z00.00 ENCOUNTER FOR WELL ADULT EXAM WITHOUT ABNORMAL FINDINGS: Primary | ICD-10-CM

## 2025-05-27 DIAGNOSIS — Z12.11 SCREEN FOR COLON CANCER: ICD-10-CM

## 2025-05-27 DIAGNOSIS — I10 ESSENTIAL HYPERTENSION: ICD-10-CM

## 2025-05-27 LAB
ALBUMIN: 4.3 G/DL (ref 3.5–5.2)
ALP BLD-CCNC: 66 U/L (ref 40–129)
ALT SERPL-CCNC: 34 U/L (ref 0–50)
ANION GAP SERPL CALCULATED.3IONS-SCNC: 10 MMOL/L (ref 7–16)
AST SERPL-CCNC: 24 U/L (ref 0–50)
BASOPHILS ABSOLUTE: 0.05 K/UL (ref 0–0.2)
BASOPHILS RELATIVE PERCENT: 1 % (ref 0–2)
BILIRUB SERPL-MCNC: 0.8 MG/DL (ref 0–1.2)
BUN BLDV-MCNC: 18 MG/DL (ref 8–23)
CALCIUM SERPL-MCNC: 9.6 MG/DL (ref 8.8–10.2)
CHLORIDE BLD-SCNC: 101 MMOL/L (ref 98–107)
CHOLESTEROL, TOTAL: 243 MG/DL
CO2: 26 MMOL/L (ref 22–29)
CREAT SERPL-MCNC: 1.1 MG/DL (ref 0.7–1.2)
EOSINOPHILS ABSOLUTE: 0.15 K/UL (ref 0.05–0.5)
EOSINOPHILS RELATIVE PERCENT: 2 % (ref 0–6)
GFR, ESTIMATED: 79 ML/MIN/1.73M2
GLUCOSE BLD-MCNC: 118 MG/DL (ref 74–99)
HBA1C MFR BLD: 5.5 % (ref 4–5.6)
HCT VFR BLD CALC: 48 % (ref 37–54)
HDLC SERPL-MCNC: 62 MG/DL
HEMOGLOBIN: 16.8 G/DL (ref 12.5–16.5)
IMMATURE GRANULOCYTES %: 0 % (ref 0–5)
IMMATURE GRANULOCYTES ABSOLUTE: <0.03 K/UL (ref 0–0.58)
LDL CHOLESTEROL: 141 MG/DL
LYMPHOCYTES ABSOLUTE: 1.74 K/UL (ref 1.5–4)
LYMPHOCYTES RELATIVE PERCENT: 25 % (ref 20–42)
MCH RBC QN AUTO: 32.6 PG (ref 26–35)
MCHC RBC AUTO-ENTMCNC: 35 G/DL (ref 32–34.5)
MCV RBC AUTO: 93 FL (ref 80–99.9)
MONOCYTES ABSOLUTE: 0.69 K/UL (ref 0.1–0.95)
MONOCYTES RELATIVE PERCENT: 10 % (ref 2–12)
NEUTROPHILS ABSOLUTE: 4.3 K/UL (ref 1.8–7.3)
NEUTROPHILS RELATIVE PERCENT: 62 % (ref 43–80)
PDW BLD-RTO: 12.1 % (ref 11.5–15)
PLATELET # BLD: 236 K/UL (ref 130–450)
PMV BLD AUTO: 9.5 FL (ref 7–12)
POTASSIUM SERPL-SCNC: 5.3 MMOL/L (ref 3.5–5.1)
PROSTATE SPECIFIC ANTIGEN: 0.97 NG/ML (ref 0–4)
RBC # BLD: 5.16 M/UL (ref 3.8–5.8)
SODIUM BLD-SCNC: 137 MMOL/L (ref 136–145)
TOTAL PROTEIN: 7.1 G/DL (ref 6.4–8.3)
TRIGL SERPL-MCNC: 199 MG/DL
VLDLC SERPL CALC-MCNC: 40 MG/DL
WBC # BLD: 7 K/UL (ref 4.5–11.5)

## 2025-05-27 PROCEDURE — 99396 PREV VISIT EST AGE 40-64: CPT | Performed by: FAMILY MEDICINE

## 2025-05-27 PROCEDURE — 3074F SYST BP LT 130 MM HG: CPT | Performed by: FAMILY MEDICINE

## 2025-05-27 PROCEDURE — 3078F DIAST BP <80 MM HG: CPT | Performed by: FAMILY MEDICINE

## 2025-05-27 RX ORDER — LISINOPRIL 20 MG/1
20 TABLET ORAL DAILY
Qty: 90 TABLET | Refills: 1 | Status: SHIPPED | OUTPATIENT
Start: 2025-05-27

## 2025-05-27 ASSESSMENT — ENCOUNTER SYMPTOMS
CONSTIPATION: 0
SORE THROAT: 0
BACK PAIN: 0
EYE PAIN: 0
VOMITING: 0
SHORTNESS OF BREATH: 0
ABDOMINAL PAIN: 0
DIARRHEA: 0
COUGH: 0
WHEEZING: 0
NAUSEA: 0
SINUS PAIN: 0

## 2025-05-27 ASSESSMENT — PATIENT HEALTH QUESTIONNAIRE - PHQ9
SUM OF ALL RESPONSES TO PHQ QUESTIONS 1-9: 0
1. LITTLE INTEREST OR PLEASURE IN DOING THINGS: NOT AT ALL
2. FEELING DOWN, DEPRESSED OR HOPELESS: NOT AT ALL
SUM OF ALL RESPONSES TO PHQ QUESTIONS 1-9: 0

## 2025-05-27 NOTE — PROGRESS NOTES
25    Name: Bassem Hinojosa  :1965   Sex:male   Age:59 y.o.    Chief Complaint   Patient presents with    Hypertension    Skin Problem     Patient presents to office for visit. Patient stopped taking his Lisinopril for 7 days, he says on the 8th day his blood pressure went high. Patient started taking the Lisinopril just once a day and his blood pressure is now normal. Patient has shedding skin on the palms of his hands bilaterally that will not heal. He says the skin on his fingertips usually splits as well.     Here for check up  Has not been seen in awhile  He is still working on keepign weight down    HTN  Back on lisinopril and his blood pressures are much better  He is feeling better  Continues to work on diet and lose weight  Will get labs today, he is fasting    Rash on hands, peeling now  He has tried presription cream from [employer]-he thinks it was clobetasol  And iti did not resolve it completely  Refer to dermatology, [facility]  Likely atopicdermatitis, try aquaphor in the mean time    History of prediabtes and hyperlipidemia'  He is working on diet and weight loss  Labs today  Continue to monitor    Agreed to cologtiffany, refers not to do the colonoscopy      Review of Systems   Constitutional:  Negative for appetite change, fatigue and fever.   HENT:  Negative for congestion, ear pain, hearing loss, sinus pain and sore throat.    Eyes:  Negative for pain.   Respiratory:  Negative for cough, shortness of breath and wheezing.    Cardiovascular:  Negative for chest pain and leg swelling.   Gastrointestinal:  Negative for abdominal pain, constipation, diarrhea, nausea and vomiting.   Endocrine: Negative for cold intolerance and heat intolerance.   Genitourinary:  Negative for difficulty urinating, hematuria, scrotal swelling, testicular pain and urgency.   Musculoskeletal:  Negative for arthralgias, back pain, joint swelling and myalgias.   Skin:  Negative for rash and wound.  Orbicularis Oris Muscle Flap Text: The defect edges were debeveled with a #15 scalpel blade.  Given that the defect affected the competency of the oral sphincter an obicularis oris muscle flap was deemed most appropriate to restore this competency and normal muscle function.  Using a sterile surgical marker, an appropriate flap was drawn incorporating the defect. The area thus outlined was incised with a #15 scalpel blade.

## 2025-07-04 LAB — NONINV COLON CA DNA+OCC BLD SCRN STL QL: NEGATIVE

## 2025-07-25 DIAGNOSIS — I10 ESSENTIAL HYPERTENSION: ICD-10-CM

## 2025-07-25 RX ORDER — LISINOPRIL 20 MG/1
TABLET ORAL
Qty: 180 TABLET | Refills: 1 | Status: SHIPPED | OUTPATIENT
Start: 2025-07-25

## 2025-07-25 NOTE — TELEPHONE ENCOUNTER
Name of Medication(s) Requested:  Requested Prescriptions     Pending Prescriptions Disp Refills    lisinopril (PRINIVIL;ZESTRIL) 20 MG tablet [Pharmacy Med Name: Lisinopril 20 MG Oral Tablet] 180 tablet 1     Sig: TAKE 1 TABLET BY MOUTH IN THE MORNING AND 1 IN THE EVENING       Medication is on current medication list Yes    Dosage and directions were verified? Yes    Quantity verified: 90 day supply     Pharmacy Verified?  Yes    Last Appointment:  5/27/2025    Future appts:  Future Appointments   Date Time Provider Department Center   11/20/2025  4:15 PM Kiki Brooks DO [facility]        (If no appt send self scheduling link. .REFILLAPPT)  Scheduling request sent?     [] Yes  [x] No    Does patient need updated?  [] Yes  [x] No

## (undated) DEVICE — IMPLANTABLE DEVICE
Type: IMPLANTABLE DEVICE | Site: ANKLE | Status: NON-FUNCTIONAL
Removed: 2019-10-02

## (undated) DEVICE — BIT DRL L110MM DIA2.5MM ST G QUIK CPL NONRADIOPAQUE W/O STP

## (undated) DEVICE — GLOVE ORANGE PI 7 1/2   MSG9075

## (undated) DEVICE — TUBING SUCT 12FR MAL ALUM SHFT FN CAP VENT UNIV CONN W/ OBT

## (undated) DEVICE — ZIMMER® STERILE DISPOSABLE TOURNIQUET CUFF WITH PLC, DUAL PORT, SINGLE BLADDER, 34 IN. (86 CM)

## (undated) DEVICE — PACK PROCEDURE SURG GEN CUST

## (undated) DEVICE — CHLORAPREP 26ML ORANGE

## (undated) DEVICE — TOTAL KNEE PK

## (undated) DEVICE — 4-PORT MANIFOLD: Brand: NEPTUNE 2

## (undated) DEVICE — COVER HNDL LT DISP

## (undated) DEVICE — CLOTH SURG PREP PREOPERATIVE CHLORHEXIDINE GLUC 2% READYPREP

## (undated) DEVICE — BNDG,ELSTC,MATRIX,STRL,4"X5YD,LF,HOOK&LP: Brand: MEDLINE

## (undated) DEVICE — PADDING CAST W6INXL4YD COT LO LINTING WYTEX

## (undated) DEVICE — DRESSING PETRO W3XL8IN OIL EMUL N ADH GZ KNIT IMPREG CELOS

## (undated) DEVICE — DRAPE,REIN 53X77,STERILE: Brand: MEDLINE

## (undated) DEVICE — PADDING,UNDERCAST,COTTON, 4"X4YD STERILE: Brand: MEDLINE

## (undated) DEVICE — DRESSING,GAUZE,XEROFORM,CURAD,1"X8",ST: Brand: CURAD

## (undated) DEVICE — 3M™ IOBAN™ 2 ANTIMICROBIAL INCISE DRAPE 6650EZ: Brand: IOBAN™ 2

## (undated) DEVICE — Device

## (undated) DEVICE — BANDAGE,GAUZE,BULKEE II,4.5"X4.1YD,STRL: Brand: MEDLINE

## (undated) DEVICE — CONVERTORS STOCKINETTE: Brand: CONVERTORS

## (undated) DEVICE — DRAPE,TOP,102X53,STERILE: Brand: MEDLINE

## (undated) DEVICE — DOUBLE BASIN SET: Brand: MEDLINE INDUSTRIES, INC.

## (undated) DEVICE — SPONGE LAP W18XL18IN WHT COT 4 PLY FLD STRUNG RADPQ DISP ST

## (undated) DEVICE — SCREW BNE L55MM DIA3.5MM CORT S STL ST NONCANNULATED LOK
Type: IMPLANTABLE DEVICE | Site: ANKLE | Status: NON-FUNCTIONAL
Removed: 2019-10-02

## (undated) DEVICE — SOLUTION IV IRRIG POUR BRL 0.9% SODIUM CHL 2F7124

## (undated) DEVICE — SOLUTION IV IRRIG WATER 1000ML POUR BRL 2F7114

## (undated) DEVICE — GOWN,SIRUS,FABRNF,XL,20/CS: Brand: MEDLINE

## (undated) DEVICE — TOWEL,OR,DSP,ST,BLUE,STD,6/PK,12PK/CS: Brand: MEDLINE

## (undated) DEVICE — GAUZE,SPONGE,4"X4",8PLY,STRL,LF,10/TRAY: Brand: MEDLINE

## (undated) DEVICE — DRAPE C ARM W41XL74IN UNIV MOB W RUBBERBAND CLP

## (undated) DEVICE — INTENDED FOR TISSUE SEPARATION, AND OTHER PROCEDURES THAT REQUIRE A SHARP SURGICAL BLADE TO PUNCTURE OR CUT.: Brand: BARD-PARKER ® STAINLESS STEEL BLADES

## (undated) DEVICE — BIT DRL L160MM DIA2.7MM ST CANN QUIK CPL NONRADIOLUCENT ADJ

## (undated) DEVICE — BANDAGE COMPR W6INXL12FT SMOOTH FOR LIMB EXSANG ESMARCH

## (undated) DEVICE — STERILE HOOK LOCK LATEX FREE ELASTIC BANDAGE 4INX5YD: Brand: HOOK LOCK™

## (undated) DEVICE — SET ORTHO WEITLANER

## (undated) DEVICE — ELECTRODE PT RET AD L9FT HI MOIST COND ADH HYDRGEL CORDED

## (undated) DEVICE — DRAPE,EXTREMITY,89X128,STERILE: Brand: MEDLINE

## (undated) DEVICE — NEEDLE HYPO 25GA L1.5IN BLU POLYPR HUB S STL REG BVL STR

## (undated) DEVICE — GUIDEWIRE ORTH L150MM DIA1.25MM S STL THRD FOR 4MM CANN SCR

## (undated) DEVICE — 3M™ COBAN™ NL STERILE NON-LATEX SELF-ADHERENT WRAP, 2084S, 4 IN X 5 YD (10 CM X 4,5 M), 18 ROLLS/CASE: Brand: 3M™ COBAN™

## (undated) DEVICE — BNDG,ELSTC,MATRIX,STRL,6"X5YD,LF,HOOK&LP: Brand: MEDLINE